# Patient Record
Sex: FEMALE | Race: WHITE | Employment: UNEMPLOYED | URBAN - METROPOLITAN AREA
[De-identification: names, ages, dates, MRNs, and addresses within clinical notes are randomized per-mention and may not be internally consistent; named-entity substitution may affect disease eponyms.]

---

## 2017-08-07 ENCOUNTER — TELEPHONE (OUTPATIENT)
Dept: FAMILY MEDICINE CLINIC | Age: 22
End: 2017-08-07

## 2017-08-25 ENCOUNTER — OFFICE VISIT (OUTPATIENT)
Dept: FAMILY MEDICINE CLINIC | Age: 22
End: 2017-08-25

## 2017-08-25 VITALS
WEIGHT: 135 LBS | TEMPERATURE: 98.5 F | DIASTOLIC BLOOD PRESSURE: 76 MMHG | SYSTOLIC BLOOD PRESSURE: 112 MMHG | OXYGEN SATURATION: 99 % | BODY MASS INDEX: 22.49 KG/M2 | HEIGHT: 65 IN | HEART RATE: 75 BPM

## 2017-08-25 DIAGNOSIS — Z00.00 ANNUAL PHYSICAL EXAM: Primary | ICD-10-CM

## 2017-08-25 LAB
BILIRUBIN, POC: NEGATIVE
BLOOD URINE, POC: NEGATIVE
CLARITY, POC: CLEAR
COLOR, POC: YELLOW
GLUCOSE URINE, POC: NEGATIVE
KETONES, POC: NEGATIVE
LEUKOCYTE EST, POC: NEGATIVE
NITRITE, POC: NEGATIVE
PH, POC: 7
PROTEIN, POC: NEGATIVE
SPECIFIC GRAVITY, POC: 1.01
UROBILINOGEN, POC: 0.2

## 2017-08-25 PROCEDURE — 81002 URINALYSIS NONAUTO W/O SCOPE: CPT | Performed by: FAMILY MEDICINE

## 2017-08-25 PROCEDURE — 99395 PREV VISIT EST AGE 18-39: CPT | Performed by: FAMILY MEDICINE

## 2017-08-25 ASSESSMENT — PATIENT HEALTH QUESTIONNAIRE - PHQ9
SUM OF ALL RESPONSES TO PHQ9 QUESTIONS 1 & 2: 0
2. FEELING DOWN, DEPRESSED OR HOPELESS: 0
SUM OF ALL RESPONSES TO PHQ QUESTIONS 1-9: 0
1. LITTLE INTEREST OR PLEASURE IN DOING THINGS: 0

## 2017-09-29 ENCOUNTER — OFFICE VISIT (OUTPATIENT)
Dept: FAMILY MEDICINE CLINIC | Age: 22
End: 2017-09-29

## 2017-09-29 VITALS
BODY MASS INDEX: 23.52 KG/M2 | OXYGEN SATURATION: 99 % | HEIGHT: 64 IN | TEMPERATURE: 98.9 F | SYSTOLIC BLOOD PRESSURE: 116 MMHG | HEART RATE: 88 BPM | DIASTOLIC BLOOD PRESSURE: 60 MMHG | RESPIRATION RATE: 17 BRPM | WEIGHT: 137.8 LBS

## 2017-09-29 DIAGNOSIS — J30.2 SEASONAL ALLERGIC RHINITIS, UNSPECIFIED ALLERGIC RHINITIS TRIGGER: ICD-10-CM

## 2017-09-29 DIAGNOSIS — Z23 NEED FOR INFLUENZA VACCINATION: ICD-10-CM

## 2017-09-29 DIAGNOSIS — R10.84 GENERALIZED ABDOMINAL CRAMPING: Primary | ICD-10-CM

## 2017-09-29 PROCEDURE — 90471 IMMUNIZATION ADMIN: CPT | Performed by: CLINICAL NURSE SPECIALIST

## 2017-09-29 PROCEDURE — 99213 OFFICE O/P EST LOW 20 MIN: CPT | Performed by: CLINICAL NURSE SPECIALIST

## 2017-09-29 PROCEDURE — 90686 IIV4 VACC NO PRSV 0.5 ML IM: CPT | Performed by: CLINICAL NURSE SPECIALIST

## 2017-09-29 ASSESSMENT — ENCOUNTER SYMPTOMS
HEMATOCHEZIA: 1
SHORTNESS OF BREATH: 0
CONSTIPATION: 0
NAUSEA: 0
ABDOMINAL PAIN: 1
VOMITING: 0
COUGH: 0
BACK PAIN: 0
BLOATING: 0
CHEST TIGHTNESS: 0
TENESMUS: 0

## 2018-01-05 ENCOUNTER — OFFICE VISIT (OUTPATIENT)
Dept: FAMILY MEDICINE CLINIC | Age: 23
End: 2018-01-05

## 2018-01-05 VITALS
HEIGHT: 64 IN | DIASTOLIC BLOOD PRESSURE: 70 MMHG | BODY MASS INDEX: 22.88 KG/M2 | TEMPERATURE: 98.6 F | HEART RATE: 110 BPM | SYSTOLIC BLOOD PRESSURE: 118 MMHG | WEIGHT: 134 LBS | OXYGEN SATURATION: 99 %

## 2018-01-05 DIAGNOSIS — R42 POSTURAL DIZZINESS WITH PRESYNCOPE: Primary | ICD-10-CM

## 2018-01-05 DIAGNOSIS — R55 POSTURAL DIZZINESS WITH PRESYNCOPE: Primary | ICD-10-CM

## 2018-01-05 DIAGNOSIS — Z00.00 ANNUAL PHYSICAL EXAM: ICD-10-CM

## 2018-01-05 DIAGNOSIS — R42 LIGHTHEADEDNESS: ICD-10-CM

## 2018-01-05 DIAGNOSIS — R42 DIZZINESS: ICD-10-CM

## 2018-01-05 LAB
BASOPHILS ABSOLUTE: 0 K/UL (ref 0–0.2)
BASOPHILS RELATIVE PERCENT: 0.3 %
BILIRUBIN, POC: NORMAL
BLOOD URINE, POC: NORMAL
CHOLESTEROL, TOTAL: 162 MG/DL (ref 0–199)
CLARITY, POC: CLEAR
COLOR, POC: YELLOW
CONTROL: NORMAL
EOSINOPHILS ABSOLUTE: 0 K/UL (ref 0–0.6)
EOSINOPHILS RELATIVE PERCENT: 0.7 %
GLUCOSE BLD-MCNC: 76 MG/DL
GLUCOSE URINE, POC: NORMAL
HCT VFR BLD CALC: 38.8 % (ref 36–48)
HDLC SERPL-MCNC: 70 MG/DL (ref 40–60)
HEMOGLOBIN: 13.2 G/DL (ref 12–16)
KETONES, POC: NORMAL
LDL CHOLESTEROL CALCULATED: 82 MG/DL
LEUKOCYTE EST, POC: NORMAL
LYMPHOCYTES ABSOLUTE: 1.5 K/UL (ref 1–5.1)
LYMPHOCYTES RELATIVE PERCENT: 24.9 %
MCH RBC QN AUTO: 32.7 PG (ref 26–34)
MCHC RBC AUTO-ENTMCNC: 34.1 G/DL (ref 31–36)
MCV RBC AUTO: 96 FL (ref 80–100)
MONOCYTES ABSOLUTE: 0.3 K/UL (ref 0–1.3)
MONOCYTES RELATIVE PERCENT: 4.3 %
NEUTROPHILS ABSOLUTE: 4.2 K/UL (ref 1.7–7.7)
NEUTROPHILS RELATIVE PERCENT: 69.8 %
NITRITE, POC: NORMAL
PDW BLD-RTO: 12.4 % (ref 12.4–15.4)
PH, POC: 6.5
PLATELET # BLD: 213 K/UL (ref 135–450)
PMV BLD AUTO: 8.3 FL (ref 5–10.5)
PREGNANCY TEST URINE, POC: NORMAL
PROTEIN, POC: NORMAL
RBC # BLD: 4.04 M/UL (ref 4–5.2)
SPECIFIC GRAVITY, POC: 1.02
TRIGL SERPL-MCNC: 52 MG/DL (ref 0–150)
TSH SERPL DL<=0.05 MIU/L-ACNC: 2.23 UIU/ML (ref 0.27–4.2)
UROBILINOGEN, POC: 0.2
VITAMIN D 25-HYDROXY: 36.1 NG/ML
VLDLC SERPL CALC-MCNC: 10 MG/DL
WBC # BLD: 6 K/UL (ref 4–11)

## 2018-01-05 PROCEDURE — 93000 ELECTROCARDIOGRAM COMPLETE: CPT | Performed by: FAMILY MEDICINE

## 2018-01-05 PROCEDURE — 81002 URINALYSIS NONAUTO W/O SCOPE: CPT | Performed by: FAMILY MEDICINE

## 2018-01-05 PROCEDURE — 82962 GLUCOSE BLOOD TEST: CPT | Performed by: FAMILY MEDICINE

## 2018-01-05 PROCEDURE — 81025 URINE PREGNANCY TEST: CPT | Performed by: FAMILY MEDICINE

## 2018-01-05 PROCEDURE — 99214 OFFICE O/P EST MOD 30 MIN: CPT | Performed by: FAMILY MEDICINE

## 2018-01-06 ASSESSMENT — ENCOUNTER SYMPTOMS
BLOOD IN STOOL: 0
SHORTNESS OF BREATH: 0
DIARRHEA: 0
COUGH: 0
ABDOMINAL PAIN: 0
CONSTIPATION: 0
NAUSEA: 0
CHEST TIGHTNESS: 0

## 2018-01-06 NOTE — PROGRESS NOTES
SUBJECTIVE:  Nicolette Amaro   1995   female   No Known Allergies    Chief Complaint   Patient presents with    Other     diziness. started two days ago at work, felt as if she would pass out, BP was high. Patient Active Problem List    Diagnosis Date Noted    Allergic rhinitis 07/22/2014       HPI   Pt is here today with an episode of lightheadedness and feeling as if she was going to pass out and heart racing. Reports she ha been at work as a  in a nursing home for several hrs when she started to feel light headed and felt like she could pass out. She then sat down and sx improved. Denies CP,SOB, ha, or any neurologic sx. Denies any OTC meds. Has been eating well. Some caffeine throughout the day. No recent URI sx, fever/chills. Past Medical History:   Diagnosis Date    Allergic rhinitis      Social History     Social History    Marital status: Single     Spouse name: N/A    Number of children: N/A    Years of education: N/A     Occupational History    Not on file. Social History Main Topics    Smoking status: Never Smoker    Smokeless tobacco: Never Used    Alcohol use No    Drug use: No    Sexual activity: Yes     Partners: Male     Birth control/ protection: Pill     Other Topics Concern    Not on file     Social History Narrative    No narrative on file     No family history on file. Review of Systems   Constitutional: Negative for activity change, appetite change, fatigue and unexpected weight change. Respiratory: Negative for cough, chest tightness and shortness of breath. Cardiovascular: Negative for chest pain, palpitations and leg swelling. Gastrointestinal: Negative for abdominal pain, blood in stool, constipation, diarrhea and nausea. Endocrine: Negative for cold intolerance and heat intolerance. Musculoskeletal: Negative for arthralgias and myalgias. Skin: Negative for rash. Neurological: Negative.   Negative for weakness, light-headedness and no Micro    POCT Glucose    POCT urine pregnancy    EKG 12 Lead     Order Specific Question:   Reason for Exam?     Answer: Other     No current outpatient prescriptions on file. No current facility-administered medications for this visit. No Follow-up on file.     Tesha Edwards MD

## 2018-02-12 ENCOUNTER — OFFICE VISIT (OUTPATIENT)
Dept: FAMILY MEDICINE CLINIC | Age: 23
End: 2018-02-12

## 2018-02-12 VITALS
HEIGHT: 64 IN | DIASTOLIC BLOOD PRESSURE: 70 MMHG | WEIGHT: 137.4 LBS | OXYGEN SATURATION: 99 % | RESPIRATION RATE: 17 BRPM | SYSTOLIC BLOOD PRESSURE: 120 MMHG | BODY MASS INDEX: 23.46 KG/M2 | HEART RATE: 124 BPM | TEMPERATURE: 101.2 F

## 2018-02-12 DIAGNOSIS — R50.9 FEVER, UNSPECIFIED FEVER CAUSE: ICD-10-CM

## 2018-02-12 DIAGNOSIS — R52 BODY ACHES: ICD-10-CM

## 2018-02-12 DIAGNOSIS — J11.1 INFLUENZA-LIKE ILLNESS: Primary | ICD-10-CM

## 2018-02-12 LAB
INFLUENZA A ANTIBODY: NORMAL
INFLUENZA B ANTIBODY: NORMAL
S PYO AG THROAT QL: NORMAL

## 2018-02-12 PROCEDURE — 87804 INFLUENZA ASSAY W/OPTIC: CPT | Performed by: CLINICAL NURSE SPECIALIST

## 2018-02-12 PROCEDURE — 99214 OFFICE O/P EST MOD 30 MIN: CPT | Performed by: CLINICAL NURSE SPECIALIST

## 2018-02-12 PROCEDURE — 87880 STREP A ASSAY W/OPTIC: CPT | Performed by: CLINICAL NURSE SPECIALIST

## 2018-02-12 RX ORDER — OSELTAMIVIR PHOSPHATE 75 MG/1
75 CAPSULE ORAL 2 TIMES DAILY
Qty: 10 CAPSULE | Refills: 0 | Status: SHIPPED | OUTPATIENT
Start: 2018-02-12 | End: 2018-02-17

## 2018-02-12 ASSESSMENT — ENCOUNTER SYMPTOMS
COUGH: 1
CONSTIPATION: 0
SORE THROAT: 1
SINUS PRESSURE: 0
CHEST TIGHTNESS: 0
RHINORRHEA: 1
SHORTNESS OF BREATH: 0
ABDOMINAL PAIN: 1
CHANGE IN BOWEL HABIT: 0
VOMITING: 0
NAUSEA: 0
FLU SYMPTOMS: 1
SINUS PAIN: 0

## 2018-12-20 NOTE — PATIENT INSTRUCTIONS
Tamiflu 1 capsule twice a day    Delsym twice a day as needed for cough. Cepacol Lozenges as needed for sore throat. Tylenol and or Motrin as needed for pain and fever. Encourage rest and fluids. Follow up if symptoms worsen of persist      Patient Education        Influenza (Flu): Care Instructions  Your Care Instructions    Influenza (flu) is an infection in the lungs and breathing passages. It is caused by the influenza virus. There are different strains, or types, of the flu virus from year to year. Unlike the common cold, the flu comes on suddenly and the symptoms, such as a cough, congestion, fever, chills, fatigue, aches, and pains, are more severe. These symptoms may last up to 10 days. Although the flu can make you feel very sick, it usually doesn't cause serious health problems. Home treatment is usually all you need for flu symptoms. But your doctor may prescribe antiviral medicine to prevent other health problems, such as pneumonia, from developing. Older people and those who have a long-term health condition, such as lung disease, are most at risk for having pneumonia or other health problems. Follow-up care is a key part of your treatment and safety. Be sure to make and go to all appointments, and call your doctor if you are having problems. It's also a good idea to know your test results and keep a list of the medicines you take. How can you care for yourself at home? · Get plenty of rest.  · Drink plenty of fluids, enough so that your urine is light yellow or clear like water. If you have kidney, heart, or liver disease and have to limit fluids, talk with your doctor before you increase the amount of fluids you drink. · Take an over-the-counter pain medicine if needed, such as acetaminophen (Tylenol), ibuprofen (Advil, Motrin), or naproxen (Aleve), to relieve fever, headache, and muscle aches. Read and follow all instructions on the label.  No one younger than 20 should take the day. It may go up during hot weather or when you exercise, wear heavy clothes, or take a hot bath. Your temperature may also be different depending on how you take it. A temperature taken in the mouth (oral) or under the arm may be a little lower than your core temperature (rectal). What is a fever temperature? A core temperature of 100.4°F or above is considered a fever. What can cause a fever? A fever may be caused by:  · Infections. This is the most common cause of a fever. Examples of infections that can cause a fever include the flu, a kidney infection, or pneumonia. · Some medicines. · Severe trauma or injury, such as a heart attack, stroke, heatstroke, or burns. · Other medical conditions, such as arthritis and some cancers. How can you treat a fever at home? · Ask your doctor if you can take an over-the-counter pain medicine, such as acetaminophen (Tylenol), ibuprofen (Advil, Motrin), or naproxen (Aleve). Be safe with medicines. Read and follow all instructions on the label. · To prevent dehydration, drink plenty of fluids. Choose water and other caffeine-free clear liquids until you feel better. If you have kidney, heart, or liver disease and have to limit fluids, talk with your doctor before you increase the amount of fluids you drink. Follow-up care is a key part of your treatment and safety. Be sure to make and go to all appointments, and call your doctor if you are having problems. It's also a good idea to know your test results and keep a list of the medicines you take. Where can you learn more? Go to [email or web address] org and sign in to your [employer] account. Enter E743 in the [employer] box to learn more about \"Learning About Fever. \"     If you do not have an account, please click on the \"Sign Up Now\" link. Current as of: March 20, 2017  Content Version: 11.5  © 6635-1679 Healthwise, Incorporated.  Care instructions adapted under license by [facility] Abdomen soft, nontender, nondistended, bowel sounds present in all 4 quadrants.

## 2019-05-22 ENCOUNTER — NURSE TRIAGE (OUTPATIENT)
Dept: OTHER | Facility: CLINIC | Age: 24
End: 2019-05-22

## 2019-05-22 NOTE — TELEPHONE ENCOUNTER
Reason for Disposition   ACUTE NEUROLOGIC SYMPTOM and now fine    Protocols used: HEAD INJURY-ADULT-[place]    Pt reports dizziness after a fall in which she hit her head. States this fall happened Monday night while intoxicated. Pt reports blurry vision and states she vomited once on Monday night. Soft transfer to Dr. Dannie Willoughby, symptoms explained and she states she would feel comfortable with pt going to ED. Explained to pt care advice in which she plans to follow. Will be going to [address] [place] [address] ED for evaluation. Care advice as documented.

## 2019-06-21 ENCOUNTER — OFFICE VISIT (OUTPATIENT)
Dept: FAMILY MEDICINE CLINIC | Age: 24
End: 2019-06-21
Payer: COMMERCIAL

## 2019-06-21 VITALS
DIASTOLIC BLOOD PRESSURE: 70 MMHG | HEART RATE: 80 BPM | BODY MASS INDEX: 24.24 KG/M2 | TEMPERATURE: 98.7 F | OXYGEN SATURATION: 99 % | WEIGHT: 142 LBS | SYSTOLIC BLOOD PRESSURE: 118 MMHG | HEIGHT: 64 IN | RESPIRATION RATE: 17 BRPM

## 2019-06-21 DIAGNOSIS — N30.00 ACUTE CYSTITIS WITHOUT HEMATURIA: Primary | ICD-10-CM

## 2019-06-21 DIAGNOSIS — R39.9 UTI SYMPTOMS: ICD-10-CM

## 2019-06-21 LAB
BILIRUBIN, POC: NORMAL
BLOOD URINE, POC: NORMAL
CLARITY, POC: NORMAL
COLOR, POC: YELLOW
CONTROL: NORMAL
GLUCOSE URINE, POC: NORMAL
KETONES, POC: NORMAL
LEUKOCYTE EST, POC: NORMAL
NITRITE, POC: NORMAL
PH, POC: 7
PREGNANCY TEST URINE, POC: NORMAL
PROTEIN, POC: NORMAL
SPECIFIC GRAVITY, POC: 1.01
UROBILINOGEN, POC: NORMAL

## 2019-06-21 PROCEDURE — 99213 OFFICE O/P EST LOW 20 MIN: CPT | Performed by: CLINICAL NURSE SPECIALIST

## 2019-06-21 PROCEDURE — 81002 URINALYSIS NONAUTO W/O SCOPE: CPT | Performed by: CLINICAL NURSE SPECIALIST

## 2019-06-21 PROCEDURE — 81025 URINE PREGNANCY TEST: CPT | Performed by: CLINICAL NURSE SPECIALIST

## 2019-06-21 RX ORDER — NITROFURANTOIN 25; 75 MG/1; MG/1
100 CAPSULE ORAL 2 TIMES DAILY
Qty: 10 CAPSULE | Refills: 0 | Status: SHIPPED | OUTPATIENT
Start: 2019-06-21 | End: 2019-06-26

## 2019-06-21 ASSESSMENT — ENCOUNTER SYMPTOMS
STRIDOR: 0
WHEEZING: 0
VOMITING: 0
DIARRHEA: 0
NAUSEA: 0
CHEST TIGHTNESS: 0
ABDOMINAL PAIN: 0
COUGH: 0

## 2019-06-21 NOTE — PATIENT INSTRUCTIONS
Cultures pending    Antibiotics as directed twice a day 5 days    Encourage plenty of water, avoid caffeine    Azo as needed for bladder spasms, may discolor her urine    Tylenol as needed/directed for discomfort    Call the office if develop back pain, fever, nausea or vomiting     Patient Education        Urinary Tract Infection in Women: Care Instructions  Your Care Instructions    A urinary tract infection, or UTI, is a general term for an infection anywhere between the kidneys and the urethra (where urine comes out). Most UTIs are bladder infections. They often cause pain or burning when you urinate. UTIs are caused by bacteria and can be cured with antibiotics. Be sure to complete your treatment so that the infection goes away. Follow-up care is a key part of your treatment and safety. Be sure to make and go to all appointments, and call your doctor if you are having problems. It's also a good idea to know your test results and keep a list of the medicines you take. How can you care for yourself at home? · Take your antibiotics as directed. Do not stop taking them just because you feel better. You need to take the full course of antibiotics. · Drink extra water and other fluids for the next day or two. This may help wash out the bacteria that are causing the infection. (If you have kidney, heart, or liver disease and have to limit fluids, talk with your doctor before you increase your fluid intake.)  · Avoid drinks that are carbonated or have caffeine. They can irritate the bladder. · Urinate often. Try to empty your bladder each time. · To relieve pain, take a hot bath or lay a heating pad set on low over your lower belly or genital area. Never go to sleep with a heating pad in place. To prevent UTIs  · Drink plenty of water each day. This helps you urinate often, which clears bacteria from your system.  (If you have kidney, heart, or liver disease and have to limit fluids, talk with your doctor before you increase your fluid intake.)  · Urinate when you need to. · Urinate right after you have sex. · Change sanitary pads often. · Avoid douches, bubble baths, feminine hygiene sprays, and other feminine hygiene products that have deodorants. · After going to the bathroom, wipe from front to back. When should you call for help? Call your doctor now or seek immediate medical care if:    · Symptoms such as fever, chills, nausea, or vomiting get worse or appear for the first time.     · You have new pain in your back just below your rib cage. This is called flank pain.     · There is new blood or pus in your urine.     · You have any problems with your antibiotic medicine.    Watch closely for changes in your health, and be sure to contact your doctor if:    · You are not getting better after taking an antibiotic for 2 days.     · Your symptoms go away but then come back. Where can you learn more? Go to https://L[employer]io.[email or web address]. org and sign in to your [employer] account. Enter K813 in the [employer] box to learn more about \"Urinary Tract Infection in Women: Care Instructions. \"     If you do not have an account, please click on the \"Sign Up Now\" link. Current as of: December 19, 2018  Content Version: 12.0  © 7848-4560 Healthwise, Incorporated. Care instructions adapted under license by [facility] ([facility]). If you have questions about a medical condition or this instruction, always ask your healthcare professional. [place] [address] any warranty or liability for your use of this information.

## 2019-06-21 NOTE — PROGRESS NOTES
SUBJECTIVE:    Patient ID:  Meron Hauser is a 26 y.o. female      Patient is here for an acute visit for complaints of UTI symptoms for 2 days. Urinary Tract Infection    This is a new problem. Episode onset: 2 days. The problem occurs every urination. The problem has been gradually worsening. The quality of the pain is described as burning. The pain is at a severity of 5/10. There has been no fever. She is sexually active. There is no history of pyelonephritis. Associated symptoms include frequency, hesitancy, a possible pregnancy and urgency. Pertinent negatives include no chills, discharge, flank pain, hematuria, nausea, sweats or vomiting. She has tried nothing for the symptoms. There is no history of kidney stones, recurrent UTIs, a single kidney or a urological procedure. No current outpatient medications on file prior to visit. No current facility-administered medications on file prior to visit. Past Medical History:   Diagnosis Date    Allergic rhinitis      No past surgical history on file. No family history on file.   Social History     Socioeconomic History    Marital status: Single     Spouse name: Not on file    Number of children: Not on file    Years of education: Not on file    Highest education level: Not on file   Occupational History    Not on file   Social Needs    Financial resource strain: Not on file    Food insecurity:     Worry: Not on file     Inability: Not on file    Transportation needs:     Medical: Not on file     Non-medical: Not on file   Tobacco Use    Smoking status: Never Smoker    Smokeless tobacco: Never Used   Substance and Sexual Activity    Alcohol use: No     Alcohol/week: 0.0 oz    Drug use: No    Sexual activity: Yes     Partners: Male     Birth control/protection: Pill   Lifestyle    Physical activity:     Days per week: Not on file     Minutes per session: Not on file    Stress: Not on file   Relationships    Social connections: Talks on phone: Not on file     Gets together: Not on file     Attends Sabianism service: Not on file     Active member of club or organization: Not on file     Attends meetings of clubs or organizations: Not on file     Relationship status: Not on file    Intimate partner violence:     Fear of current or ex partner: Not on file     Emotionally abused: Not on file     Physically abused: Not on file     Forced sexual activity: Not on file   Other Topics Concern    Not on file   Social History Narrative    Not on file       Review of Systems   Constitutional: Negative for chills and fever. Eyes: Negative for visual disturbance. Respiratory: Negative for cough, chest tightness, wheezing and stridor. Cardiovascular: Negative for chest pain and palpitations. Gastrointestinal: Negative for abdominal pain, diarrhea, nausea and vomiting. Genitourinary: Positive for dysuria, frequency, hesitancy and urgency. Negative for flank pain and hematuria. Musculoskeletal: Negative for arthralgias. Skin: Negative for rash. OBJECTIVE:    Physical Exam   Constitutional: She is oriented to person, place, and time. She appears well-developed and well-nourished. No distress. HENT:   Head: Normocephalic and atraumatic. Right Ear: External ear normal.   Left Ear: External ear normal.   Nose: Nose normal.   Eyes: Pupils are equal, round, and reactive to light. Conjunctivae are normal.   Neck: Normal range of motion. Neck supple. No tracheal deviation present. Cardiovascular: Normal rate, regular rhythm and normal heart sounds. Pulmonary/Chest: Effort normal and breath sounds normal. No respiratory distress. She has no wheezes. She has no rales. She exhibits no tenderness. Abdominal: Soft. Bowel sounds are normal. She exhibits no distension. There is tenderness (mild) in the suprapubic area. There is no guarding and no CVA tenderness. Musculoskeletal: Normal range of motion. She exhibits no edema. Neurological: She is alert and oriented to person, place, and time. Skin: Skin is warm and dry. No rash noted. Psychiatric: She has a normal mood and affect. Her behavior is normal.   Nursing note and vitals reviewed. /70 (Site: Left Upper Arm, Position: Sitting, Cuff Size: Medium Adult)   Pulse 80   Temp 98.7 °F (37.1 °C)   Resp 17   Ht 5' 4\" (1.626 m)   Wt 142 lb (64.4 kg)   LMP 05/21/2019   SpO2 99%   BMI 24.37 kg/m²    BP Readings from Last 3 Encounters:   06/21/19 118/70   02/12/18 120/70   01/05/18 118/70      Wt Readings from Last 3 Encounters:   06/21/19 142 lb (64.4 kg)   02/12/18 137 lb 6.4 oz (62.3 kg)   01/05/18 134 lb (60.8 kg)       ASSESSMENT & PLAN:    1. Acute cystitis without hematuria  - nitrofurantoin, macrocrystal-monohydrate, (MACROBID) 100 MG capsule; Take 1 capsule by mouth 2 times daily for 5 days  Dispense: 10 capsule; Refill: 0  - Cultures pending  - Antibiotics as directed twice a day 5 days  - Encourage plenty of water, avoid caffeine  - Azo as needed for bladder spasms, may discolor her urine  - Tylenol as needed/directed for discomfort  - Call the office if develop back pain, fever, nausea or vomiting     2. UTI symptoms  - POCT Urinalysis no Micro (trace of leukocytes)  - URINE CULTURE (pending)  - POCT urine pregnancy (negative)      Continue current treatment plan. Current Outpatient Medications   Medication Sig Dispense Refill    nitrofurantoin, macrocrystal-monohydrate, (MACROBID) 100 MG capsule Take 1 capsule by mouth 2 times daily for 5 days 10 capsule 0     No current facility-administered medications for this visit. Return if symptoms worsen or fail to improve, for cystitis, UTI symptoms. Call office if experience side effects from medications. Please note that some or all of this record was generated using voice recognition software.  If there are any questions about the content of this document, please contact the author as some errors in transcription may have occurred.

## 2019-06-24 LAB
ORGANISM: ABNORMAL
URINE CULTURE, ROUTINE: ABNORMAL
URINE CULTURE, ROUTINE: ABNORMAL

## 2019-09-17 ENCOUNTER — TELEPHONE (OUTPATIENT)
Dept: FAMILY MEDICINE CLINIC | Age: 24
End: 2019-09-17

## 2019-09-20 ENCOUNTER — OFFICE VISIT (OUTPATIENT)
Dept: FAMILY MEDICINE CLINIC | Age: 24
End: 2019-09-20
Payer: COMMERCIAL

## 2019-09-20 VITALS
WEIGHT: 142 LBS | OXYGEN SATURATION: 97 % | SYSTOLIC BLOOD PRESSURE: 110 MMHG | BODY MASS INDEX: 24.37 KG/M2 | DIASTOLIC BLOOD PRESSURE: 70 MMHG | HEART RATE: 104 BPM | TEMPERATURE: 98.3 F

## 2019-09-20 DIAGNOSIS — R30.9 PAINFUL URINATION: Primary | ICD-10-CM

## 2019-09-20 DIAGNOSIS — N30.00 ACUTE CYSTITIS WITHOUT HEMATURIA: ICD-10-CM

## 2019-09-20 LAB
BACTERIA: ABNORMAL /HPF
BILIRUBIN URINE: ABNORMAL
BILIRUBIN, POC: ABNORMAL
BLOOD URINE, POC: ABNORMAL
BLOOD, URINE: NEGATIVE
CLARITY, POC: CLEAR
CLARITY: CLEAR
COLOR, POC: ABNORMAL
COLOR: YELLOW
EPITHELIAL CELLS, UA: 5 /HPF (ref 0–5)
GLUCOSE URINE, POC: ABNORMAL
GLUCOSE URINE: NEGATIVE MG/DL
HYALINE CASTS: 5 /LPF (ref 0–8)
KETONES, POC: ABNORMAL
KETONES, URINE: NEGATIVE MG/DL
LEUKOCYTE EST, POC: ABNORMAL
LEUKOCYTE ESTERASE, URINE: ABNORMAL
MICROSCOPIC EXAMINATION: YES
NITRITE, POC: ABNORMAL
NITRITE, URINE: NEGATIVE
PH UA: 6.5 (ref 5–8)
PH, POC: 6.5
PROTEIN UA: ABNORMAL MG/DL
PROTEIN, POC: 100
RBC UA: 7 /HPF (ref 0–4)
SPECIFIC GRAVITY UA: 1.03 (ref 1–1.03)
SPECIFIC GRAVITY, POC: 1.02
URINE TYPE: ABNORMAL
UROBILINOGEN, POC: 1
UROBILINOGEN, URINE: 1 E.U./DL
WBC UA: 13 /HPF (ref 0–5)

## 2019-09-20 PROCEDURE — 81002 URINALYSIS NONAUTO W/O SCOPE: CPT | Performed by: FAMILY MEDICINE

## 2019-09-20 PROCEDURE — 99213 OFFICE O/P EST LOW 20 MIN: CPT | Performed by: FAMILY MEDICINE

## 2019-09-20 ASSESSMENT — PATIENT HEALTH QUESTIONNAIRE - PHQ9
2. FEELING DOWN, DEPRESSED OR HOPELESS: 0
SUM OF ALL RESPONSES TO PHQ QUESTIONS 1-9: 0
SUM OF ALL RESPONSES TO PHQ QUESTIONS 1-9: 0
SUM OF ALL RESPONSES TO PHQ9 QUESTIONS 1 & 2: 0
1. LITTLE INTEREST OR PLEASURE IN DOING THINGS: 0

## 2019-09-21 LAB — URINE CULTURE, ROUTINE: NORMAL

## 2019-09-23 LAB
C. TRACHOMATIS DNA ,URINE: POSITIVE
N. GONORRHOEAE DNA, URINE: NEGATIVE

## 2019-09-25 ENCOUNTER — OFFICE VISIT (OUTPATIENT)
Dept: FAMILY MEDICINE CLINIC | Age: 24
End: 2019-09-25
Payer: COMMERCIAL

## 2019-09-25 VITALS
TEMPERATURE: 98.7 F | HEART RATE: 98 BPM | OXYGEN SATURATION: 99 % | BODY MASS INDEX: 24.72 KG/M2 | SYSTOLIC BLOOD PRESSURE: 120 MMHG | WEIGHT: 144 LBS | RESPIRATION RATE: 16 BRPM | DIASTOLIC BLOOD PRESSURE: 84 MMHG

## 2019-09-25 DIAGNOSIS — A74.9 CHLAMYDIA INFECTION: Primary | ICD-10-CM

## 2019-09-25 DIAGNOSIS — R30.0 DYSURIA: ICD-10-CM

## 2019-09-25 PROCEDURE — 99213 OFFICE O/P EST LOW 20 MIN: CPT | Performed by: FAMILY MEDICINE

## 2019-09-25 RX ORDER — DOXYCYCLINE HYCLATE 100 MG
100 TABLET ORAL 2 TIMES DAILY
Qty: 14 TABLET | Refills: 0 | Status: SHIPPED | OUTPATIENT
Start: 2019-09-25 | End: 2019-10-02

## 2019-09-26 RX ORDER — CEFTRIAXONE 500 MG/1
500 INJECTION, POWDER, FOR SOLUTION INTRAMUSCULAR; INTRAVENOUS ONCE
Qty: 500 MG | Refills: 0
Start: 2019-09-26 | End: 2019-09-26

## 2019-09-26 ASSESSMENT — ENCOUNTER SYMPTOMS
COUGH: 0
SHORTNESS OF BREATH: 0
ABDOMINAL PAIN: 0

## 2019-09-26 NOTE — PROGRESS NOTES
SUBJECTIVE:  Liss Rodney   1995   female   No Known Allergies    Chief Complaint   Patient presents with    Discuss Labs        Patient Active Problem List    Diagnosis Date Noted    Allergic rhinitis 07/22/2014       HPI   Pt is here today for tx of Chlamydia vaginitis. She was evaluated a few d ago for persisting dysuria after being treated for UTI per GYN. Urine cx were negative but positive for C. Trachomatis. Pt reports sx have been intermittent and can be mild burning with urination or a vaginal DC. No abdominal pain, N,V or fever. She did engage in unprotected sex prior to sx.    Past Medical History:   Diagnosis Date    Allergic rhinitis      Social History     Socioeconomic History    Marital status: Single     Spouse name: Not on file    Number of children: Not on file    Years of education: Not on file    Highest education level: Not on file   Occupational History    Not on file   Social Needs    Financial resource strain: Not on file    Food insecurity:     Worry: Not on file     Inability: Not on file    Transportation needs:     Medical: Not on file     Non-medical: Not on file   Tobacco Use    Smoking status: Never Smoker    Smokeless tobacco: Never Used   Substance and Sexual Activity    Alcohol use: No     Alcohol/week: 0.0 standard drinks    Drug use: No    Sexual activity: Yes     Partners: Male     Birth control/protection: Pill   Lifestyle    Physical activity:     Days per week: Not on file     Minutes per session: Not on file    Stress: Not on file   Relationships    Social connections:     Talks on phone: Not on file     Gets together: Not on file     Attends Worship service: Not on file     Active member of club or organization: Not on file     Attends meetings of clubs or organizations: Not on file     Relationship status: Not on file    Intimate partner violence:     Fear of current or ex partner: Not on file     Emotionally abused: Not on file

## 2019-09-30 ASSESSMENT — ENCOUNTER SYMPTOMS
SHORTNESS OF BREATH: 0
COUGH: 0
ABDOMINAL PAIN: 0

## 2019-09-30 NOTE — PROGRESS NOTES
SUBJECTIVE:  Paulo Hill   1995   female   No Known Allergies    Chief Complaint   Patient presents with    Urinary Tract Infection     in on 6/22/19 treated for UTI finished antibiotics still felt the UTI was present went to GYN in august showed UTI  given same antibiotic. Feels the UTI still has not fulled cleared         Patient Active Problem List    Diagnosis Date Noted    Allergic rhinitis 07/22/2014       HPI   Pt is here today co intermittent pain/burning with urination. She has been treated here and and GYN office for UTI. reports sx improved for a few days and returned. She thinks she was tested for other things at GYN office but not sure? Denies abdominal pain, N,V. No fever/chills. Maybe a mild yellow vaginal DC .she has had unprotected sex in the last couple of weeks. No hx of STI.    Past Medical History:   Diagnosis Date    Allergic rhinitis      Social History     Socioeconomic History    Marital status: Single     Spouse name: Not on file    Number of children: Not on file    Years of education: Not on file    Highest education level: Not on file   Occupational History    Not on file   Social Needs    Financial resource strain: Not on file    Food insecurity:     Worry: Not on file     Inability: Not on file    Transportation needs:     Medical: Not on file     Non-medical: Not on file   Tobacco Use    Smoking status: Never Smoker    Smokeless tobacco: Never Used   Substance and Sexual Activity    Alcohol use: No     Alcohol/week: 0.0 standard drinks    Drug use: No    Sexual activity: Yes     Partners: Male     Birth control/protection: Pill   Lifestyle    Physical activity:     Days per week: Not on file     Minutes per session: Not on file    Stress: Not on file   Relationships    Social connections:     Talks on phone: Not on file     Gets together: Not on file     Attends Christian service: Not on file     Active member of club or organization: Not on file has been treated X2  Will send urine for cx      Orders Placed This Encounter   Procedures    Urine Culture     Order Specific Question:   Specify (ex-cath, midstream, cysto, etc)? Answer:   midstream    Urine Culture     Order Specific Question:   Specify (ex-cath, midstream, cysto, etc)? Answer:   midstream    C.trachomatis N.gonorrhoeae DNA, Urine    Urinalysis    Urinalysis    Microscopic Urinalysis    POCT Urinalysis no Micro     Current Outpatient Medications   Medication Sig Dispense Refill    doxycycline hyclate (VIBRA-TABS) 100 MG tablet Take 1 tablet by mouth 2 times daily for 7 days 14 tablet 0     No current facility-administered medications for this visit. Return if symptoms worsen or fail to improve.     Elvie Pineda MD

## 2019-10-04 ENCOUNTER — OFFICE VISIT (OUTPATIENT)
Dept: FAMILY MEDICINE CLINIC | Age: 24
End: 2019-10-04
Payer: COMMERCIAL

## 2019-10-04 VITALS
RESPIRATION RATE: 16 BRPM | DIASTOLIC BLOOD PRESSURE: 78 MMHG | SYSTOLIC BLOOD PRESSURE: 122 MMHG | HEART RATE: 73 BPM | BODY MASS INDEX: 24.82 KG/M2 | OXYGEN SATURATION: 98 % | WEIGHT: 144.6 LBS | TEMPERATURE: 98.1 F

## 2019-10-04 DIAGNOSIS — A74.9 CHLAMYDIA INFECTION: Primary | ICD-10-CM

## 2019-10-04 DIAGNOSIS — A64 STI (SEXUALLY TRANSMITTED INFECTION): ICD-10-CM

## 2019-10-04 PROCEDURE — 99213 OFFICE O/P EST LOW 20 MIN: CPT | Performed by: FAMILY MEDICINE

## 2019-10-05 ASSESSMENT — ENCOUNTER SYMPTOMS
ABDOMINAL PAIN: 0
CHEST TIGHTNESS: 0
COUGH: 0

## 2019-10-07 LAB
C. TRACHOMATIS DNA ,URINE: NEGATIVE
N. GONORRHOEAE DNA, URINE: NEGATIVE

## 2020-12-28 ENCOUNTER — VIRTUAL VISIT (OUTPATIENT)
Dept: FAMILY MEDICINE CLINIC | Age: 25
End: 2020-12-28
Payer: COMMERCIAL

## 2020-12-28 ENCOUNTER — NURSE TRIAGE (OUTPATIENT)
Dept: OTHER | Facility: CLINIC | Age: 25
End: 2020-12-28

## 2020-12-28 ENCOUNTER — OFFICE VISIT (OUTPATIENT)
Dept: PRIMARY CARE CLINIC | Age: 25
End: 2020-12-28
Payer: COMMERCIAL

## 2020-12-28 LAB
INFLUENZA A ANTIBODY: NORMAL
INFLUENZA B ANTIBODY: NORMAL
S PYO AG THROAT QL: NORMAL
SARS-COV-2, NAA: NOT DETECTED

## 2020-12-28 PROCEDURE — 87880 STREP A ASSAY W/OPTIC: CPT | Performed by: NURSE PRACTITIONER

## 2020-12-28 PROCEDURE — 99211 OFF/OP EST MAY X REQ PHY/QHP: CPT | Performed by: NURSE PRACTITIONER

## 2020-12-28 PROCEDURE — 99214 OFFICE O/P EST MOD 30 MIN: CPT | Performed by: CLINICAL NURSE SPECIALIST

## 2020-12-28 PROCEDURE — 87804 INFLUENZA ASSAY W/OPTIC: CPT | Performed by: NURSE PRACTITIONER

## 2020-12-28 PROCEDURE — 87804 INFLUENZA ASSAY W/OPTIC: CPT | Performed by: CLINICAL NURSE SPECIALIST

## 2020-12-28 ASSESSMENT — ENCOUNTER SYMPTOMS
RHINORRHEA: 0
DIARRHEA: 0
SORE THROAT: 1
COUGH: 0
CONSTIPATION: 0
VOMITING: 0
CHEST TIGHTNESS: 0
WHEEZING: 0
SHORTNESS OF BREATH: 0
NAUSEA: 0
ABDOMINAL PAIN: 0

## 2020-12-28 ASSESSMENT — PATIENT HEALTH QUESTIONNAIRE - PHQ9
SUM OF ALL RESPONSES TO PHQ QUESTIONS 1-9: 0
SUM OF ALL RESPONSES TO PHQ QUESTIONS 1-9: 0
1. LITTLE INTEREST OR PLEASURE IN DOING THINGS: 0
2. FEELING DOWN, DEPRESSED OR HOPELESS: 0
SUM OF ALL RESPONSES TO PHQ9 QUESTIONS 1 & 2: 0
SUM OF ALL RESPONSES TO PHQ QUESTIONS 1-9: 0

## 2020-12-28 NOTE — PROGRESS NOTES
Josias Thorne received a viral test for COVID-19, Influenza and Strep. They were educated on isolation and quarantine as appropriate. For any symptoms, they were directed to seek care from their PCP, given contact information to establish with a doctor, directed to an urgent care or the emergency room.

## 2020-12-28 NOTE — PATIENT INSTRUCTIONS
Schedule for testing today (rapid strep, flu and COVID-19)    Discussed and educated about COVID-19    Antibiotic as directed (depending on results)     Delsym twice a day as needed for cough. Cepacol Lozenges as needed for sore throat. Tylenol and or Motrin as needed/directed for pain and fever. Encourage rest and fluids. Follow-up if symptoms worsen or persist      Patient Education        Learning About Coronavirus [phone])  Coronavirus [phone]): Overview  What is coronavirus (COVID-19)? The coronavirus disease (COVID-19) is caused by a virus. It is an illness that was first found in December 2019. It has since spread worldwide. The virus can cause fever, cough, and trouble breathing. In severe cases, it can cause pneumonia and make it hard to breathe without help. It can cause death. This virus spreads person-to-person through droplets from coughing and sneezing. It can also spread when you are close to someone who is infected. And it can spread when you touch something that has the virus on it, such as a doorknob or a tabletop. Coronaviruses are a large group of viruses. They cause the common cold. They also cause more serious illnesses like [place] respiratory syndrome (MERS) and severe acute respiratory syndrome (SARS). COVID-19 is caused by a novel coronavirus. That means it's a new type that has not been seen in people before. How is COVID-19 treated? Mild illness can be treated at home, but more serious illness needs to be treated in the hospital. Treatment may include medicines to reduce symptoms, plus breathing support such as oxygen therapy or a ventilator. Other treatments, such as antiviral medicines, may help people who have COVID-19. What can you do to protect yourself from COVID-19? The best way to protect yourself from getting sick is to:  · Avoid areas where there is an outbreak. · Avoid contact with people who may be infected. · Avoid crowds and try to stay at least 6 feet away from other people. · Wash your hands often, especially after you cough or sneeze. Use soap and water, and scrub for at least 20 seconds. If soap and water aren't available, use an alcohol-based hand . · Avoid touching your mouth, nose, and eyes. What can you do to avoid spreading the virus to others? To help avoid spreading the virus to others:  · Wash your hands often with soap or alcohol-based hand sanitizers. · Cover your mouth with a tissue when you cough or sneeze. Then throw the tissue in the trash. · Use a disinfectant to clean things that you touch often. These include doorknobs, remote controls, phones, and handles on your refrigerator and microwave. And don't forget countertops, tabletops, bathrooms, and computer keyboards. · Wear a cloth face cover if you have to go to public areas. If you know or suspect that you have COVID-19:  · Stay home. Don't go to school, work, or public areas. And don't use public transportation, ride-shares, or taxis unless you have no choice. · Leave your home only if you need to get medical care or testing. But call the doctor's office first so they know you're coming. And wear a face cover. · Limit contact with people in your home. If possible, stay in a separate bedroom and use a separate bathroom. · Wear a face cover whenever you're around other people. It can help stop the spread of the virus when you cough or sneeze. · Clean and disinfect your home every day. Use household  and disinfectant wipes or sprays. Take special care to clean things that you grab with your hands. · Self-isolate until it's safe to be around others again. ? If you have symptoms, it's safe when you haven't had a fever for 3 days and your symptoms have improved and it's been at least 10 days since your symptoms started. ? If you were exposed to the virus but don't have symptoms, it's safe to be around others 14 days after exposure. ? Talk to your doctor about whether you also need testing, especially if you have a weakened immune system. When to call for help  Call 911 anytime you think you may need emergency care. For example, call if:  · You have severe trouble breathing. (You can't talk at all.)  · You have constant chest pain or pressure. · You are severely dizzy or lightheaded. · You are confused or can't think clearly. · Your face and lips have a blue color. · You passed out (lost consciousness) or are very hard to wake up. Call your doctor now if you develop symptoms such as:  · Shortness of breath. · Fever. · Cough. If you need to get care, call ahead to the doctor's office for instructions before you go. Make sure you wear a face cover to prevent exposing other people to the virus. Where can you get the latest information? The following health organizations are tracking and studying this virus. Their websites contain the most up-to-date information. Titus Lizama also learn what to do if you think you may have been exposed to the virus. · U.S. Centers for Disease Control and Prevention (CDC): The CDC provides updated news about the disease and travel advice. The website also tells you how to prevent the spread of infection. www.cdc.gov  · World Health Organization [facility]): WHO offers information about the virus outbreaks. WHO also has travel advice. www.who.int  Current as of: July 10, 2020               Content Version: 12.6  © 2006-2020 [employer]. Care instructions adapted under license by [facility] ([facility]). If you have questions about a medical condition or this instruction, always ask your healthcare professional. [place] [address] any warranty or liability for your use of this information.          Patient Education        Coronavirus (MXTHI-10): Care Instructions  Overview The coronavirus disease (COVID-19) is caused by a virus. Symptoms may include a fever, a cough, and shortness of breath. It mainly spreads person-to-person through droplets from coughing and sneezing. The virus also can spread when people are in close contact with someone who is infected. Most people have mild symptoms and can take care of themselves at home. If their symptoms get worse, they may need care in a hospital. Treatment may include medicines to reduce symptoms, plus breathing support such as oxygen therapy or a ventilator. It's important to not spread the virus to others. If you have COVID-19, wear a face cover anytime you are around other people. You need to isolate yourself while you are sick. Leave your home only if you need to get medical care or testing. Follow-up care is a key part of your treatment and safety. Be sure to make and go to all appointments, and call your doctor if you are having problems. It's also a good idea to know your test results and keep a list of the medicines you take. How can you care for yourself at home? · Get extra rest. It can help you feel better. · Drink plenty of fluids. This helps replace fluids lost from fever. Fluids also help ease a scratchy throat. Water, soup, fruit juice, and hot tea with lemon are good choices. · Take acetaminophen (such as Tylenol) to reduce a fever. It may also help with muscle aches. Read and follow all instructions on the label. · Use petroleum jelly on sore skin. This can help if the skin around your nose and lips becomes sore from rubbing a lot with tissues. Tips for self-isolation  · Limit contact with people in your home. If possible, stay in a separate bedroom and use a separate bathroom. · Wear a cloth face cover when you are around other people. It can help stop the spread of the virus when you cough or sneeze. · If you have to leave home, avoid crowds and try to stay at least 6 feet away from other people. · Avoid contact with pets and other animals. · Cover your mouth and nose with a tissue when you cough or sneeze. Then throw it in the trash right away. · Wash your hands often, especially after you cough or sneeze. Use soap and water, and scrub for at least 20 seconds. If soap and water aren't available, use an alcohol-based hand . · Don't share personal household items. These include bedding, towels, cups and glasses, and eating utensils. · [address] in the warmest water allowed for the fabric type, and dry it completely. It's okay to wash other people's laundry with yours. · Clean and disinfect your home every day. Use household  and disinfectant wipes or sprays. Take special care to clean things that you grab with your hands. These include doorknobs, remote controls, phones, and handles on your refrigerator and microwave. And don't forget countertops, tabletops, bathrooms, and computer keyboards. When you can end self-isolation  · If you know or suspect that you have COVID-19, stay in self-isolation until:  ? You haven't had a fever for 3 days, and  ? Your symptoms have improved, and  ? It's been at least 10 days since your symptoms started. · Talk to your doctor about whether you also need testing, especially if you have a weakened immune system. When should you call for help? Call 911 anytime you think you may need emergency care. For example, call if you have life-threatening symptoms, such as:    · You have severe trouble breathing. (You can't talk at all.)     · You have constant chest pain or pressure.     · You are severely dizzy or lightheaded.     · You are confused or can't think clearly.     · Your face and lips have a blue color.     · You pass out (lose consciousness) or are very hard to wake up. Call your doctor now or seek immediate medical care if:    · You have moderate trouble breathing.  (You can't speak a full sentence.)   · You are coughing up blood (more than about 1 teaspoon).     · You have signs of low blood pressure. These include feeling lightheaded; being too weak to stand; and having cold, pale, clammy skin. Watch closely for changes in your health, and be sure to contact your doctor if:    · Your symptoms get worse.     · You are not getting better as expected. Call before you go to the doctor's office. Follow their instructions. And wear a cloth face cover. Current as of: July 10, 2020               Content Version: 12.6  © 2006-2020 [facility]. Care instructions adapted under license by [facility] ([facility]). If you have questions about a medical condition or this instruction, always ask your healthcare professional. [address] any warranty or liability for your use of this information.

## 2020-12-28 NOTE — TELEPHONE ENCOUNTER
Reason for Disposition   Patient wants to be seen    Answer Assessment - Initial Assessment Questions  1. LOCATION: \"Where is the swollen node located? \" \"Is the matching node on the other side of the body also swollen? \"       Right side of neck. Denies swelling on the left side    2. SIZE: \"How big is the node? \" (Inches or centimeters) (or compare to common objects such as pea, bean, marble, golf ball)       About the size of a marble    3. ONSET: \"When did the swelling start? \"       Started 10 days ago    4. NECK NODES: \"Is there a sore throat, runny nose or other symptoms of a cold? \"       Sore throat that comes and goes. 5. GROIN OR ARMPIT NODES: \"Is there a sore, scratch, cut or painful red area on that arm or leg? \"       Denies    6. FEVER: \"Do you have a fever? \" If so, ask: \"What is it, how was it measured, and when did it start? \"       Denies    7. CAUSE: \"What do you think is causing the swollen lymph nodes? \"      Infection or strep    8. OTHER SYMPTOMS: \"Do you have any other symptoms? \"      Denies    9. PREGNANCY: \"Is there any chance you are pregnant? \" \"When was your last menstrual period? \"      Yes. States last period 12/23/20 took test that was negative    Protocols used: LYMPH NODES - [facility]    Patient called pre-service center [facility] with red flag complaint. Brief description of triage: see above    Triage indicates for patient toto be seen by pcp today or tomorrow    Care advice provided, patient verbalizes understanding; denies any other questions or concerns; instructed to call back for any new or worsening symptoms. Writer provided warm transfer to [facility] for appointment scheduling. Attention Provider: Thank you for allowing me to participate in the care of your patient. The patient was connected to triage in response to information provided to the [facility].  Please do not respond through this encounter as the response is not directed to a shared pool.

## 2020-12-28 NOTE — PATIENT INSTRUCTIONS

## 2020-12-28 NOTE — PROGRESS NOTES
SUBJECTIVE:    Patient ID:  Amaury Frankel is a 23 y.o. female      This visit was conducted virtually for concerns of sore throat, swollen lymph node. States she was positive for COVID-19 in July. 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 Pharyngitis  This is a new problem. Episode onset: 2 weeks. The problem occurs intermittently. The problem has been gradually improving. Associated symptoms include arthralgias, fatigue, headaches, myalgias and a sore throat. Pertinent negatives include no abdominal pain, chest pain, chills, congestion, coughing, fever, nausea, rash or vomiting. The symptoms are aggravated by drinking and eating. She has tried nothing for the symptoms. No current outpatient medications on file prior to visit. No current facility-administered medications on file prior to visit. Past Medical History:   Diagnosis Date    Allergic rhinitis      History reviewed. No pertinent surgical history. History reviewed. No pertinent family history.   Social History     Socioeconomic History    Marital status: Single     Spouse name: Not on file    Number of children: Not on file    Years of education: Not on file    Highest education level: Not on file   Occupational History    Not on file   Social Needs    Financial resource strain: Not on file    Food insecurity     Worry: Not on file     Inability: Not on file    Transportation needs     Medical: Not on file     Non-medical: Not on file   Tobacco Use    Smoking status: Never Smoker    Smokeless tobacco: Never Used   Substance and Sexual Activity    Alcohol use: No     Alcohol/week: 0.0 standard drinks    Drug use: No    Sexual activity: Yes     Partners: Male     Birth control/protection: Pill   Lifestyle    Physical activity     Days per week: Not on file     Minutes per session: Not on file    Stress: Not on file   Relationships    Social connections     Talks on phone: Not on file Gets together: Not on file     Attends [employer] service: Not on file     Active member of club or organization: Not on file     Attends meetings of clubs or organizations: Not on file     Relationship status: Not on file    Intimate partner violence     Fear of current or ex partner: Not on file     Emotionally abused: Not on file     Physically abused: Not on file     Forced sexual activity: Not on file   Other Topics Concern    Not on file   Social History Narrative    Not on file       Review of Systems   Constitutional: Positive for fatigue. Negative for chills and fever. HENT: Positive for postnasal drip and sore throat. Negative for congestion and rhinorrhea. Eyes: Negative for visual disturbance. Respiratory: Negative for cough, chest tightness, shortness of breath and wheezing. Cardiovascular: Negative for chest pain, palpitations and leg swelling. Gastrointestinal: Negative for abdominal pain, constipation, diarrhea, nausea and vomiting. Musculoskeletal: Positive for arthralgias and myalgias. Skin: Negative for rash. Neurological: Positive for headaches. OBJECTIVE:    Physical Exam  Vitals signs and nursing note reviewed. Constitutional:       General: She is not in acute distress. Appearance: Normal appearance. She is not ill-appearing, toxic-appearing or diaphoretic. HENT:      Head: Normocephalic and atraumatic. Right Ear: External ear normal.      Left Ear: External ear normal.      Nose: Nose normal.      Mouth/Throat:      Mouth: Mucous membranes are moist.   Eyes:      Extraocular Movements: Extraocular movements intact. Conjunctiva/sclera: Conjunctivae normal.      Pupils: Pupils are equal, round, and reactive to light. Neck:      Musculoskeletal: Normal range of motion. Pulmonary:      Effort: Pulmonary effort is normal. No respiratory distress. Musculoskeletal: Normal range of motion.    Lymphadenopathy: Cervical: Cervical adenopathy present. Right cervical: Superficial cervical adenopathy (per patient) present. Skin:     General: Skin is dry. Coloration: Skin is not pale. Findings: No rash. Neurological:      Mental Status: She is alert and oriented to person, place, and time. Psychiatric:         Mood and Affect: Mood normal.         Behavior: Behavior normal.       There were no vitals taken for this visit. BP Readings from Last 3 Encounters:   10/04/19 122/78   09/25/19 120/84   09/20/19 110/70      Wt Readings from Last 3 Encounters:   10/04/19 144 lb 9.6 oz (65.6 kg)   09/25/19 144 lb (65.3 kg)   09/20/19 142 lb (64.4 kg)       ASSESSMENT & PLAN:    1. COVID-19 ruled out  - COVID-19 Ambulatory; Future    2. Educated about COVID-19 virus infection  - Discussed and educated about COVID-19    3. Sore throat  - POCT rapid strep A   - Culture positive for strep G  - Amoxicillin 500 mg TID for 10 days sent into pharmacy     4. Generalized body aches  - POCT Influenza A/B (neagative)     5. Lymphadenitis, acute  - Throat culture positive for strep      Continue current treatment plan. Current Outpatient Medications   Medication Sig Dispense Refill    amoxicillin-clavulanate (AUGMENTIN) 500-125 MG per tablet Take 1 tablet by mouth 2 times daily for 7 days 14 tablet 0     No current facility-administered medications for this visit. Return if symptoms worsen or fail to improve, for Rule out Covid-19, sore throat, generalized body aches, lymphadnitis. [facility] received counseling on the following healthy behaviors: nutrition, exercise and medication adherence    Discussed use, benefit, and side effects of prescribed medications. Barriers to medication compliance addressed. All patient questions answered. Pt voiced understanding. Call office if experience side effects from medications.

## 2020-12-30 LAB
ORGANISM: ABNORMAL
THROAT CULTURE: ABNORMAL
THROAT CULTURE: ABNORMAL

## 2020-12-30 RX ORDER — AMOXICILLIN AND CLAVULANATE POTASSIUM 500; 125 MG/1; MG/1
1 TABLET, FILM COATED ORAL 2 TIMES DAILY
Qty: 14 TABLET | Refills: 0 | Status: SHIPPED
Start: 2020-12-30 | End: 2020-12-30 | Stop reason: CLARIF

## 2020-12-30 RX ORDER — AMOXICILLIN 500 MG/1
500 CAPSULE ORAL 3 TIMES DAILY
Qty: 30 CAPSULE | Refills: 0 | Status: SHIPPED | OUTPATIENT
Start: 2020-12-30 | End: 2021-01-09

## 2020-12-30 NOTE — RESULT ENCOUNTER NOTE
Pt advised negative for influenza and covid but positive for strep  [facility] [address]

## 2021-01-19 ENCOUNTER — VIRTUAL VISIT (OUTPATIENT)
Dept: FAMILY MEDICINE CLINIC | Age: 26
End: 2021-01-19
Payer: COMMERCIAL

## 2021-01-19 DIAGNOSIS — J02.0 STREP THROAT: Primary | ICD-10-CM

## 2021-01-19 DIAGNOSIS — B37.0 THRUSH: ICD-10-CM

## 2021-01-19 PROCEDURE — 99213 OFFICE O/P EST LOW 20 MIN: CPT | Performed by: FAMILY MEDICINE

## 2021-01-19 ASSESSMENT — ENCOUNTER SYMPTOMS
DIARRHEA: 0
CHEST TIGHTNESS: 0
SHORTNESS OF BREATH: 0
COUGH: 0
SORE THROAT: 0
ABDOMINAL PAIN: 0

## 2021-01-19 ASSESSMENT — PATIENT HEALTH QUESTIONNAIRE - PHQ9
SUM OF ALL RESPONSES TO PHQ QUESTIONS 1-9: 0
SUM OF ALL RESPONSES TO PHQ QUESTIONS 1-9: 0
2. FEELING DOWN, DEPRESSED OR HOPELESS: 0
1. LITTLE INTEREST OR PLEASURE IN DOING THINGS: 0

## 2021-01-19 NOTE — PROGRESS NOTES
2021    TELEHEALTH EVALUATION -- Audio/Visual (During QSC-22 public health emergency)    HPI:    Roscoe Kathleen (:  1995) has requested an audio/video evaluation for the following concern(s):    Pt is here today by video VV for fu on strep throat and co a white tongue. She was evaluated for ST, swollen glands, fatigue fever/chills 2-3 weeks ago and dx with strep throat. She had tested negative for Flu and COVID at that time. She was treated with Amoxil for 10 d which she has finished about a week ago. She was feeling well but has noticed some fatigue and white film on tongue which has gotten worse. No ST, fever/ chills. Swollen glands have resolved. No known exposure. Review of Systems   Constitutional: Positive for fatigue. Negative for activity change, appetite change, chills, fever and unexpected weight change. HENT: Negative for congestion and sore throat. Respiratory: Negative for cough, chest tightness and shortness of breath. Cardiovascular: Negative for chest pain. Gastrointestinal: Negative for abdominal pain and diarrhea. Musculoskeletal: Negative for arthralgias and myalgias. Skin: Negative for rash. Neurological: Negative for light-headedness and headaches. Hematological: Negative for adenopathy. Does not bruise/bleed easily. Prior to Visit Medications    Medication Sig Taking? Authorizing Provider   nystatin (MYCOSTATIN) [id number] UNIT/ML suspension Take 5 mLs by mouth 4 times daily for 14 days Yes James Linton MD       Social History     Tobacco Use    Smoking status: Never Smoker    Smokeless tobacco: Never Used   Substance Use Topics    Alcohol use: No     Alcohol/week: 0.0 standard drinks    Drug use: No        Past Medical History:   Diagnosis Date    Allergic rhinitis    , No past surgical history on file.,   Social History     Tobacco Use    Smoking status: Never Smoker    Smokeless tobacco: Never Used   Substance Use Topics    Alcohol use:  No Alcohol/week: 0.0 standard drinks    Drug use: No       PHYSICAL EXAMINATION:  [ INSTRUCTIONS:  \"[x]\" Indicates a positive item  \"[]\" Indicates a negative item  -- DELETE ALL ITEMS NOT EXAMINED]  Vital Signs: (As obtained by patient/caregiver or practitioner observation)    Blood pressure-  Heart rate-    Respiratory rate-    Temperature-  Pulse oximetry-     Constitutional: [x] Appears well-developed and well-nourished [x] No apparent distress      [] Abnormal-   Mental status  [x] Alert and awake  [x] Oriented to person/place/time []Able to follow commands      Eyes:  EOM    [x]  Normal  [] Abnormal-  Sclera  []  Normal  [] Abnormal -         Discharge []  None visible  [] Abnormal -    HENT:   [x] Normocephalic, atraumatic. [] Abnormal   [x] Mouth/Throat: Mucous membranes are moist. There is a white patch on tongue and smaller patches on palate. Tonsils do not seen erythematous on enlarged. Mouth- moist mm. There is white patch on tongue and palate  External Ears [] Normal  [] Abnormal-     Neck: [] No visualized mass     Pulmonary/Chest: [x] Respiratory effort normal.  [x] No visualized signs of difficulty breathing or respiratory distress        [] Abnormal-      Musculoskeletal:   [] Normal gait with no signs of ataxia         [] Normal range of motion of neck        [] Abnormal-       Neurological:        [x] No Facial Asymmetry (Cranial nerve 7 motor function) (limited exam to video visit)          [] No gaze palsy        [] Abnormal-         Skin:        [x] No significant exanthematous lesions or discoloration noted on facial skin         [] Abnormal-            Psychiatric:       [x] Normal Affect [] No Hallucinations        [] Abnormal-     Other pertinent observable physical exam findings-     ASSESSMENT/PLAN:  1. Strep throat  Reviewed history. Sx resolved  Encouraged appr hydration and rest     2. Thrush  Nystatin for 1 week  Re evaluate if sx persist in 1-2 weeks      No follow-ups on file.

## 2021-03-03 ENCOUNTER — IMMUNIZATION (OUTPATIENT)
Dept: PRIMARY CARE CLINIC | Age: 26
End: 2021-03-03
Payer: COMMERCIAL

## 2021-03-03 PROCEDURE — 91301 COVID-19, MODERNA VACCINE 100MCG/0.5ML DOSE: CPT | Performed by: FAMILY MEDICINE

## 2021-03-03 PROCEDURE — 0011A COVID-19, MODERNA VACCINE 100MCG/0.5ML DOSE: CPT | Performed by: FAMILY MEDICINE

## 2021-03-31 ENCOUNTER — IMMUNIZATION (OUTPATIENT)
Dept: PRIMARY CARE CLINIC | Age: 26
End: 2021-03-31
Payer: COMMERCIAL

## 2021-03-31 PROCEDURE — 91301 COVID-19, MODERNA VACCINE 100MCG/0.5ML DOSE: CPT | Performed by: FAMILY MEDICINE

## 2021-03-31 PROCEDURE — 0012A COVID-19, MODERNA VACCINE 100MCG/0.5ML DOSE: CPT | Performed by: FAMILY MEDICINE

## 2021-04-05 ENCOUNTER — NURSE TRIAGE (OUTPATIENT)
Dept: OTHER | Facility: CLINIC | Age: 26
End: 2021-04-05

## 2021-04-05 ENCOUNTER — OFFICE VISIT (OUTPATIENT)
Dept: FAMILY MEDICINE CLINIC | Age: 26
End: 2021-04-05
Payer: COMMERCIAL

## 2021-04-05 VITALS
DIASTOLIC BLOOD PRESSURE: 70 MMHG | SYSTOLIC BLOOD PRESSURE: 110 MMHG | BODY MASS INDEX: 23.48 KG/M2 | OXYGEN SATURATION: 100 % | WEIGHT: 136.8 LBS | HEART RATE: 81 BPM | TEMPERATURE: 97.7 F | RESPIRATION RATE: 16 BRPM

## 2021-04-05 DIAGNOSIS — H81.13 BENIGN PAROXYSMAL POSITIONAL VERTIGO DUE TO BILATERAL VESTIBULAR DISORDER: Primary | ICD-10-CM

## 2021-04-05 PROCEDURE — 99213 OFFICE O/P EST LOW 20 MIN: CPT | Performed by: FAMILY MEDICINE

## 2021-04-05 RX ORDER — METHYLPREDNISOLONE 4 MG/1
TABLET ORAL
Qty: 1 KIT | Refills: 0 | Status: SHIPPED | OUTPATIENT
Start: 2021-04-05 | End: 2021-04-11

## 2021-04-05 RX ORDER — MECLIZINE HYDROCHLORIDE 25 MG/1
25 TABLET ORAL 2 TIMES DAILY PRN
Qty: 12 TABLET | Refills: 0 | Status: SHIPPED | OUTPATIENT
Start: 2021-04-05 | End: 2021-04-15

## 2021-04-05 NOTE — PATIENT INSTRUCTIONS
Patient Education        Benign Paroxysmal Positional Vertigo (BPPV): Care Instructions  Your Care Instructions     Benign paroxysmal positional vertigo, also called BPPV, is an inner ear problem. It causes a spinning or whirling sensation when you move your head. This sensation is called vertigo. The vertigo usually lasts for less than a minute. People often have vertigo spells for a few days or weeks. Then the vertigo goes away. But it may come back again. The vertigo may be mild, or it may be bad enough to cause unsteadiness, nausea, and vomiting. When you move, your inner ear sends messages to the brain. This helps you keep your balance. Vertigo can happen when debris builds up in the inner ear. The buildup can cause the inner ear to send the wrong message to the brain. Your doctor may move you in different positions to help your vertigo get better faster. This is called the Epley maneuver. Your doctor may also prescribe medicines or exercises to help with your symptoms. Follow-up care is a key part of your treatment and safety. Be sure to make and go to all appointments, and call your doctor if you are having problems. It's also a good idea to know your test results and keep a list of the medicines you take. How can you care for yourself at home? · If your doctor suggests that you do Dover-Daroff exercises:  ? Sit on the edge of a bed or sofa. Quickly lie down on the side that causes the worst vertigo. Lie on your side with your ear down. ? Stay in this position for at least 30 seconds or until the vertigo goes away. ? Sit up. If this causes vertigo, wait for it to stop. ? Repeat the procedure on the other side. ? Repeat this 10 times. Do these exercises 2 times a day until the vertigo is gone. When should you call for help? Call 911 anytime you think you may need emergency care. For example, call if:    · You have symptoms of a stroke.  These may include:  ? Sudden numbness, tingling, weakness, or loss of movement in your face, arm, or leg, especially on only one side of your body. ? Sudden vision changes. ? Sudden trouble speaking. ? Sudden confusion or trouble understanding simple statements. ? Sudden problems with walking or balance. ? A sudden, severe headache that is different from past headaches. Call your doctor now or seek immediate medical care if:    · You have new or worse nausea and vomiting.     · You have new symptoms such as hearing loss or roaring in your ears. Watch closely for changes in your health, and be sure to contact your doctor if:    · You are not getting better as expected.     · Your vertigo gets worse. Where can you learn more? Go to https://Tantalinepepiceweb.[employer]. org and sign in to your [employer] account. Enter  in the [employer] box to learn more about \"Benign Paroxysmal Positional Vertigo (BPPV): Care Instructions. \"     If you do not have an account, please click on the \"Sign Up Now\" link. Current as of: December 2, 2020               Content Version: 12.8  © 4122-9424 Healthwise, Incorporated. Care instructions adapted under license by [facility] ([facility]). If you have questions about a medical condition or this instruction, always ask your healthcare professional. [place] [address] any warranty or liability for your use of this information.

## 2021-04-05 NOTE — PROGRESS NOTES
Subjective:       Pam Vazquez is a 23 y.o. female who presents for evaluation dizziness. The symptoms started 1 day ago and are unchanged. The attacks occur every time with side turning her head to left or right while she was laying down this morning  and last a few seconds. Positions that worsen symptoms: rolling in bed to the left and rolling in bed to the right. Previous workup/treatments: none. Associated ear symptoms: none. Associated CNS symptoms: none. Recent infections: none, occasional seasonal allergy symptoms. Head trauma: denied. Denies chest pain shortness of breath or palpitations  Patient's medications, allergies, past medical, surgical, social and family histories were reviewed and updated as appropriate. Review of Systems  Pertinent items are noted in HPI. Objective:      /70 (Site: Right Upper Arm, Position: Sitting, Cuff Size: Medium Adult)   Pulse 81   Temp 97.7 °F (36.5 °C) (Temporal)   Resp 16   Wt 136 lb 12.8 oz (62.1 kg)   LMP 03/05/2021 (Exact Date)   SpO2 100%   BMI 23.48 kg/m²   General appearance: alert, appears stated age and cooperative  Eyes: conjunctivae/corneas clear. PERRL, EOM's intact. Fundi benign. Ears: normal TM's and external ear canals both ears  Throat: lips, mucosa, and tongue normal; teeth and gums normal  Neck: no adenopathy, supple, symmetrical, trachea midline and thyroid not enlarged, symmetric, no tenderness/mass/nodules  Lungs: clear to auscultation bilaterally  Heart: regular rate and rhythm  Abdomen: soft, non-tender; bowel sounds normal; no masses,  no organomegaly  Neurologic: Alert and oriented X 3, normal strength and tone. Normal symmetric reflexes. Normal coordination and gait  Mental status: Alert, oriented, thought content appropriate  Cranial nerves: normal  Gait: Normal        Assessment:      Benign positional vertigo      Plan:      Meclizine per med orders. Steroids per med orders. Follow up: 1 week.   Written information on benign positional vertigo given

## 2021-06-14 RX ORDER — FLUCONAZOLE 150 MG/1
150 TABLET ORAL ONCE
Qty: 1 TABLET | Refills: 0 | OUTPATIENT
Start: 2021-06-14 | End: 2021-06-14

## 2021-06-14 NOTE — TELEPHONE ENCOUNTER
Medial to get prescribed this for make it in the past but please advise her that it is over-the-counter she can purchase it at any pharmacy

## 2021-06-14 NOTE — TELEPHONE ENCOUNTER
Pt called stating she has a yeast infection, asking for diflucan to be sent to pharmacy.  Please advise

## 2021-06-17 ASSESSMENT — ENCOUNTER SYMPTOMS
WHEEZING: 0
NAUSEA: 0
ABDOMINAL PAIN: 0
CHEST TIGHTNESS: 0
SHORTNESS OF BREATH: 0
COUGH: 0
CONSTIPATION: 0
DIARRHEA: 0
VOMITING: 0

## 2021-06-17 NOTE — PROGRESS NOTES
SUBJECTIVE:    Patient ID:  Zion Clark is a 23 y.o. female      Patient is here for an acute visit for concerns of vaginal itching, possibly chlamydia. States she tried Monistat 1 day treatment which helped for a while, but symptoms returned. Vaginal Itching  The patient's primary symptoms include genital itching. The patient's pertinent negatives include no genital odor, pelvic pain, vaginal bleeding or vaginal discharge. This is a new problem. The current episode started in the past 7 days. The problem occurs constantly. The problem has been unchanged. The patient is experiencing no pain. She is pregnant. Pertinent negatives include no abdominal pain, anorexia, chills, constipation, diarrhea, fever, flank pain, headaches, joint swelling, nausea, rash or vomiting. Painful intercourse: burning. Nothing aggravates the symptoms. Treatments tried: Monistat. The treatment provided moderate relief. She is sexually active. It is possible that her partner has an STD. She uses nothing for contraception. Her past medical history is significant for an STD (Chlamydia). (Vaginal yeast infections)       No current outpatient medications on file prior to visit. No current facility-administered medications on file prior to visit. Past Medical History:   Diagnosis Date    Allergic rhinitis      History reviewed. No pertinent surgical history. History reviewed. No pertinent family history.   Social History     Socioeconomic History    Marital status: Single     Spouse name: Not on file    Number of children: Not on file    Years of education: Not on file    Highest education level: Not on file   Occupational History    Not on file   Tobacco Use    Smoking status: Never Smoker    Smokeless tobacco: Never Used   Substance and Sexual Activity    Alcohol use: No     Alcohol/week: 0.0 standard drinks    Drug use: No    Sexual activity: Yes     Partners: Male     Birth control/protection: Pill   Other Topics Concern    Not on file   Social History Narrative    Not on file     Social Determinants of Health     Financial Resource Strain:     Difficulty of Paying Living Expenses:    Food Insecurity:     Worried About Running Out of Food in the Last Year:     [address] in the Last Year:    Transportation Needs:     Lack of Transportation (Medical):  Lack of Transportation (Non-Medical):    Physical Activity:     Days of Exercise per Week:     Minutes of Exercise per Session:    Stress:     Feeling of Stress :    Social Connections:     Frequency of Communication with Friends and Family:     Frequency of Social Gatherings with Friends and Family:     Attends Judaism Services:     Active Member of Clubs or Organizations:     Attends Club or Organization Meetings:     Marital Status:    Intimate Partner Violence:     Fear of Current or Ex-Partner:     Emotionally Abused:     Physically Abused:     Sexually Abused:        Review of Systems   Constitutional: Negative for chills and fever. Eyes: Negative for visual disturbance. Respiratory: Negative for cough, chest tightness, shortness of breath and wheezing. Cardiovascular: Negative for chest pain, palpitations and leg swelling. Gastrointestinal: Negative for abdominal pain, anorexia, constipation, diarrhea, nausea and vomiting. Genitourinary: Negative for flank pain, pelvic pain and vaginal discharge. Musculoskeletal: Negative for arthralgias and myalgias. Skin: Negative for rash. Neurological: Negative for headaches. OBJECTIVE:    Physical Exam  Vitals and nursing note reviewed. Constitutional:       General: She is not in acute distress. Appearance: She is well-developed. HENT:      Head: Normocephalic and atraumatic.       Right Ear: External ear normal.      Left Ear: External ear normal.      Nose: Nose normal.   Eyes:      Conjunctiva/sclera: Conjunctivae normal.      Pupils: Pupils are equal, round, and reactive to light. Neck:      Trachea: No tracheal deviation. Cardiovascular:      Rate and Rhythm: Normal rate and regular rhythm. Heart sounds: Normal heart sounds. Pulmonary:      Effort: Pulmonary effort is normal. No respiratory distress. Breath sounds: Normal breath sounds. No wheezing or rales. Chest:      Chest wall: No tenderness. Abdominal:      General: Bowel sounds are normal. There is no distension. Palpations: Abdomen is soft. Tenderness: There is no abdominal tenderness. Musculoskeletal:         General: Normal range of motion. Cervical back: Normal range of motion and neck supple. Skin:     General: Skin is warm and dry. Findings: No rash. Neurological:      Mental Status: She is alert and oriented to person, place, and time. Psychiatric:         Behavior: Behavior normal.       /74   Pulse 76   Temp 98.2 °F (36.8 °C)   Ht 5' 4\" (1.626 m)   Wt 136 lb (61.7 kg)   BMI 23.34 kg/m²    BP Readings from Last 3 Encounters:   06/18/21 112/74   04/05/21 110/70   10/04/19 122/78      Wt Readings from Last 3 Encounters:   06/18/21 136 lb (61.7 kg)   04/05/21 136 lb 12.8 oz (62.1 kg)   10/04/19 144 lb 9.6 oz (65.6 kg)       ASSESSMENT & PLAN:    1. Acute vaginitis  - POCT Urinalysis no Micro (WNL)  - POCT urine pregnancy (negative)  - VAGINAL PATHOGENS PROBE *A (pending)    2. Vaginal itching  - fluconazole (DIFLUCAN) 150 MG tablet; Take one tablet today, may repeat in 3 days  Dispense: 2 tablet; Refill: 0  - VAGINAL PATHOGENS PROBE *A (penidng)    3. Possible exposure to STD  - C.trachomatis N.gonorrhoeae DNA, Urine; Future (pending)      Continue current treatment plan. Current Outpatient Medications   Medication Sig Dispense Refill    fluconazole (DIFLUCAN) 150 MG tablet Take one tablet today, may repeat in 3 days 2 tablet 0     No current facility-administered medications for this visit.       Return if symptoms worsen or fail to improve, for acute vaginitis, vaginal itching, possible STD. Alyson Saleem received counseling on the following healthy behaviors: nutrition, exercise and medication adherence    Patient given educational materials on vaginitis    Discussed use, benefit, and side effects of prescribed medications. Barriers to medication compliance addressed. All patient questions answered. Pt voiced understanding. Call office if experience side effects from medications. Please note that some or all of this record was generated using voice recognition software. If there are any questions about the content of this document, please contact the author as some errors in transcription may have occurred.

## 2021-06-17 NOTE — PATIENT INSTRUCTIONS
UA and pregnancy today     Cultures pending    Discussed safe sex practices    Diflucan 150 mg once, may repeat in 3 days    Follow-up if symptoms worsen or persist  Patient Education        Bacterial Vaginosis: Care Instructions  Overview     Bacterial vaginosis is a type of vaginal infection. It is caused by excess growth of certain bacteria that are normally found in the vagina. Symptoms can include itching, swelling, pain when you urinate or have sex, and a gray or yellow discharge with a \"fishy\" odor. It is not considered an infection that is spread through sexual contact. Symptoms can be annoying and uncomfortable. But bacterial vaginosis does not usually cause other health problems. However, if you have it while you are pregnant, it can cause complications. While the infection may go away on its own, most doctors use antibiotics to treat it. You may have been prescribed pills or vaginal cream. With treatment, bacterial vaginosis usually clears up in 5 to 7 days. Follow-up care is a key part of your treatment and safety. Be sure to make and go to all appointments, and call your doctor if you are having problems. It's also a good idea to know your test results and keep a list of the medicines you take. How can you care for yourself at home? · Take your antibiotics as directed. Do not stop taking them just because you feel better. You need to take the full course of antibiotics. · Do not eat or drink anything that contains alcohol if you are taking metronidazole or tinidazole. · Keep using your medicine if you start your period. Use pads instead of tampons while using a vaginal cream or suppository. Tampons can absorb the medicine. · Wear loose cotton clothing. Do not wear nylon and other materials that hold body heat and moisture close to the skin. · Do not scratch. Relieve itching with a cold pack or a cool bath. · Do not wash your vaginal area more than once a day.  Use plain water or a mild, unscented soap. Do not douche. When should you call for help? Watch closely for changes in your health, and be sure to contact your doctor if:    · You have unexpected vaginal bleeding.     · You have a fever.     · You have new or increased pain in your vagina or pelvis.     · You are not getting better after 1 week.     · Your symptoms return after you finish the course of your medicine. Where can you learn more? Go to [email or web address]://Rockit [email or web address].[employer]. org and sign in to your [employer] account. Enter H387 in the [employer] box to learn more about \"Bacterial Vaginosis: Care Instructions. \"     If you do not have an account, please click on the \"Sign Up Now\" link. Current as of: February 11, 2021               Content Version: 12.9  © 7616-5667 Healthwise, Incorporated. Care instructions adapted under license by [facility] ([facility]). If you have questions about a medical condition or this instruction, always ask your healthcare professional. [place] [address] any warranty or liability for your use of this information.

## 2021-06-18 ENCOUNTER — OFFICE VISIT (OUTPATIENT)
Dept: FAMILY MEDICINE CLINIC | Age: 26
End: 2021-06-18
Payer: COMMERCIAL

## 2021-06-18 VITALS
HEIGHT: 64 IN | DIASTOLIC BLOOD PRESSURE: 74 MMHG | HEART RATE: 76 BPM | WEIGHT: 136 LBS | BODY MASS INDEX: 23.22 KG/M2 | TEMPERATURE: 98.2 F | SYSTOLIC BLOOD PRESSURE: 112 MMHG

## 2021-06-18 DIAGNOSIS — Z20.2 POSSIBLE EXPOSURE TO STD: ICD-10-CM

## 2021-06-18 DIAGNOSIS — N89.8 VAGINAL ITCHING: ICD-10-CM

## 2021-06-18 DIAGNOSIS — N76.0 ACUTE VAGINITIS: Primary | ICD-10-CM

## 2021-06-18 LAB
BILIRUBIN, POC: NORMAL
BLOOD URINE, POC: NORMAL
CLARITY, POC: CLEAR
COLOR, POC: YELLOW
CONTROL: NORMAL
GLUCOSE URINE, POC: NORMAL
KETONES, POC: NORMAL
LEUKOCYTE EST, POC: NORMAL
NITRITE, POC: NORMAL
PH, POC: 6
PREGNANCY TEST URINE, POC: NEGATIVE
PROTEIN, POC: NORMAL
SPECIFIC GRAVITY, POC: 1.03
UROBILINOGEN, POC: NORMAL

## 2021-06-18 PROCEDURE — 81025 URINE PREGNANCY TEST: CPT | Performed by: CLINICAL NURSE SPECIALIST

## 2021-06-18 PROCEDURE — 99214 OFFICE O/P EST MOD 30 MIN: CPT | Performed by: CLINICAL NURSE SPECIALIST

## 2021-06-18 PROCEDURE — 81002 URINALYSIS NONAUTO W/O SCOPE: CPT | Performed by: CLINICAL NURSE SPECIALIST

## 2021-06-18 RX ORDER — FLUCONAZOLE 150 MG/1
TABLET ORAL
Qty: 2 TABLET | Refills: 0 | Status: SHIPPED | OUTPATIENT
Start: 2021-06-18

## 2021-06-19 LAB
CANDIDA SPECIES, DNA PROBE: NORMAL
GARDNERELLA VAGINALIS, DNA PROBE: NORMAL
TRICHOMONAS VAGINALIS DNA: NORMAL

## 2021-06-21 ENCOUNTER — TELEPHONE (OUTPATIENT)
Dept: FAMILY MEDICINE CLINIC | Age: 26
End: 2021-06-21

## 2021-06-21 NOTE — TELEPHONE ENCOUNTER
Patient calling stating she went to [facility] Saturday to do urinalysis for gonorrheae and trachomatis but lab was closed. Patient works 7-7 unsure what to do. Results in also, provider wanted to speak to patient transferred call to [address] [facility]

## 2021-06-22 ENCOUNTER — TELEPHONE (OUTPATIENT)
Dept: FAMILY MEDICINE CLINIC | Age: 26
End: 2021-06-22

## 2021-06-22 NOTE — TELEPHONE ENCOUNTER
----- Message from Edwige Herr sent at 6/22/2021  5:27 PM EDT -----  Subject: Message to Provider    QUESTIONS  Information for Provider? pt received a call earlier today without a voice   message and was just returning the call back  ---------------------------------------------------------------------------  --------------  [address]  What is the best way for the office to contact you? OK to leave message on   voicemail  Preferred Call Back Phone Number? [phone]  ---------------------------------------------------------------------------  --------------  SCRIPT ANSWERS  Relationship to Patient?  Self

## 2021-06-24 ENCOUNTER — NURSE ONLY (OUTPATIENT)
Dept: FAMILY MEDICINE CLINIC | Age: 26
End: 2021-06-24

## 2021-06-24 VITALS — TEMPERATURE: 97.7 F

## 2021-06-24 DIAGNOSIS — Z20.2 POSSIBLE EXPOSURE TO STD: ICD-10-CM

## 2021-06-24 DIAGNOSIS — Z20.2 POSSIBLE EXPOSURE TO STD: Primary | ICD-10-CM

## 2021-06-25 LAB
C. TRACHOMATIS DNA ,URINE: NEGATIVE
N. GONORRHOEAE DNA, URINE: NEGATIVE

## 2021-09-28 ENCOUNTER — OFFICE VISIT (OUTPATIENT)
Dept: FAMILY MEDICINE CLINIC | Age: 26
End: 2021-09-28
Payer: COMMERCIAL

## 2021-09-28 VITALS
HEART RATE: 74 BPM | SYSTOLIC BLOOD PRESSURE: 100 MMHG | BODY MASS INDEX: 23.69 KG/M2 | WEIGHT: 138 LBS | DIASTOLIC BLOOD PRESSURE: 70 MMHG | TEMPERATURE: 97.8 F | OXYGEN SATURATION: 99 %

## 2021-09-28 DIAGNOSIS — L29.9 PRURITIC DERMATITIS: ICD-10-CM

## 2021-09-28 DIAGNOSIS — L23.9 ALLERGIC CONTACT DERMATITIS, UNSPECIFIED TRIGGER: Primary | ICD-10-CM

## 2021-09-28 PROCEDURE — 99213 OFFICE O/P EST LOW 20 MIN: CPT | Performed by: FAMILY MEDICINE

## 2021-09-28 RX ORDER — BETAMETHASONE DIPROPIONATE 0.5 MG/G
CREAM TOPICAL
Qty: 15 G | Refills: 0 | Status: SHIPPED | OUTPATIENT
Start: 2021-09-28 | End: 2021-10-28

## 2021-09-28 ASSESSMENT — ENCOUNTER SYMPTOMS
COUGH: 0
SHORTNESS OF BREATH: 0
ABDOMINAL PAIN: 0

## 2021-09-28 NOTE — PROGRESS NOTES
SUBJECTIVE:  Elisa Perry   1995   female   No Known Allergies    Chief Complaint   Patient presents with    Rash     hips and back of knees         Patient Active Problem List    Diagnosis Date Noted    Allergic rhinitis 07/22/2014       HPI   Patient is here today complaining of 5 to 3-week history of a rash. Patient reports that the rash is only on each side of her hips. It does sometimes get better but then it flares up intermittently. Rash is itchy, but not painful. No fever or chills  No known exposures. But speaking with patient further looks like she has some work pants that have zippers that the inside of zipper touches in the same area that the rash is. No other rashes. No were no sent home with the rash. She is an [address]  Past Medical History:   Diagnosis Date    Allergic rhinitis      Social History     Socioeconomic History    Marital status: Single     Spouse name: Not on file    Number of children: Not on file    Years of education: Not on file    Highest education level: Not on file   Occupational History    Not on file   Tobacco Use    Smoking status: Never Smoker    Smokeless tobacco: Never Used   Substance and Sexual Activity    Alcohol use: No     Alcohol/week: 0.0 standard drinks    Drug use: No    Sexual activity: Yes     Partners: Male     Birth control/protection: Pill   Other Topics Concern    Not on file   Social History Narrative    Not on file     Social Determinants of Health     Financial Resource Strain:     Difficulty of Paying Living Expenses:    Food Insecurity:     Worried About Running Out of Food in the Last Year:     [address] in the Last Year:    Transportation Needs:     Lack of Transportation (Medical):      Lack of Transportation (Non-Medical):    Physical Activity:     Days of Exercise per Week:     Minutes of Exercise per Session:    Stress:     Feeling of Stress :    Social Connections:     Frequency of Communication with Friends and Family:     Frequency of Social Gatherings with Friends and Family:     Attends Yarsani Services:     Active Member of Clubs or Organizations:     Attends Club or Organization Meetings:     Marital Status:    Intimate Partner Violence:     Fear of Current or Ex-Partner:     Emotionally Abused:     Physically Abused:     Sexually Abused:      No family history on file. Review of Systems   Constitutional: Negative for activity change, appetite change, fever and unexpected weight change. Respiratory: Negative for cough and shortness of breath. Cardiovascular: Negative for chest pain and palpitations. Gastrointestinal: Negative for abdominal pain. Musculoskeletal: Negative for arthralgias and myalgias. Skin: Positive for rash. Neurological: Negative for light-headedness and headaches. Hematological: Negative for adenopathy. Does not bruise/bleed easily. Psychiatric/Behavioral: Negative for dysphoric mood. OBJECTIVE:  /70   Pulse 74   Temp 97.8 °F (36.6 °C)   Wt 138 lb (62.6 kg)   SpO2 99%   BMI 23.69 kg/m²   Physical Exam  Vitals and nursing note reviewed. Constitutional:       Appearance: She is well-developed. Eyes:      Pupils: Pupils are equal, round, and reactive to light. Neck:      Thyroid: No thyromegaly. Vascular: No JVD. Cardiovascular:      Rate and Rhythm: Normal rate and regular rhythm. Pulmonary:      Effort: Pulmonary effort is normal.      Breath sounds: Normal breath sounds. Abdominal:      General: Bowel sounds are normal.      Palpations: Abdomen is soft. Tenderness: There is no abdominal tenderness. Musculoskeletal:      Cervical back: Normal range of motion and neck supple. Skin:     Findings: Rash present. Comments:  There are 2 very similar symmetrical patches about 3 to 4 cm of dry erythematous skin on hips   Neurological:      Mental Status: She is alert and oriented to person, place, and time.   Psychiatric:         Behavior: Behavior normal.         Thought Content: Thought content normal.         ASSESSMENT/PLAN:    1. Allergic contact dermatitis, unspecified trigger  Diprolene cream  Zyrtec as needed  Discussed that covering the back of zipper with tape or sewing more comfortable fabric so it does not come in contact directly with the skin   Reevaluate if symptoms persist    2. Pruritic dermatitis  Diprolene cream and Zyrtec      No orders of the defined types were placed in this encounter. Current Outpatient Medications   Medication Sig Dispense Refill    augmented betamethasone dipropionate (DIPROLENE AF) 0.05 % cream Apply topically 2 times daily. 15 g 0    fluconazole (DIFLUCAN) 150 MG tablet Take one tablet today, may repeat in 3 days 2 tablet 0     No current facility-administered medications for this visit. Return if symptoms worsen or fail to improve.     Quinton Ronquillo MD, MD

## 2021-10-07 ASSESSMENT — ENCOUNTER SYMPTOMS
CHEST TIGHTNESS: 0
VOMITING: 0
WHEEZING: 0
ABDOMINAL PAIN: 0
COUGH: 0
SHORTNESS OF BREATH: 0
CONSTIPATION: 0
DIARRHEA: 0
NAUSEA: 0

## 2021-10-08 ENCOUNTER — OFFICE VISIT (OUTPATIENT)
Dept: FAMILY MEDICINE CLINIC | Age: 26
End: 2021-10-08
Payer: COMMERCIAL

## 2021-10-08 VITALS
HEART RATE: 96 BPM | DIASTOLIC BLOOD PRESSURE: 80 MMHG | BODY MASS INDEX: 23.52 KG/M2 | WEIGHT: 137.8 LBS | HEIGHT: 64 IN | SYSTOLIC BLOOD PRESSURE: 112 MMHG

## 2021-10-08 DIAGNOSIS — L23.9 ALLERGIC CONTACT DERMATITIS, UNSPECIFIED TRIGGER: Primary | ICD-10-CM

## 2021-10-08 DIAGNOSIS — L29.9 PRURITIC DERMATITIS: ICD-10-CM

## 2021-10-08 PROCEDURE — 99213 OFFICE O/P EST LOW 20 MIN: CPT | Performed by: CLINICAL NURSE SPECIALIST

## 2021-10-08 RX ORDER — METHYLPREDNISOLONE 4 MG/1
TABLET ORAL
Qty: 1 KIT | Refills: 0 | Status: SHIPPED | OUTPATIENT
Start: 2021-10-08 | End: 2021-10-14

## 2021-10-08 SDOH — ECONOMIC STABILITY: FOOD INSECURITY: WITHIN THE PAST 12 MONTHS, YOU WORRIED THAT YOUR FOOD WOULD RUN OUT BEFORE YOU GOT MONEY TO BUY MORE.: NEVER TRUE

## 2021-10-08 SDOH — ECONOMIC STABILITY: FOOD INSECURITY: WITHIN THE PAST 12 MONTHS, THE FOOD YOU BOUGHT JUST DIDN'T LAST AND YOU DIDN'T HAVE MONEY TO GET MORE.: NEVER TRUE

## 2021-10-08 ASSESSMENT — ENCOUNTER SYMPTOMS: RHINORRHEA: 0

## 2021-10-08 ASSESSMENT — PATIENT HEALTH QUESTIONNAIRE - PHQ9
SUM OF ALL RESPONSES TO PHQ QUESTIONS 1-9: 0
SUM OF ALL RESPONSES TO PHQ QUESTIONS 1-9: 0
SUM OF ALL RESPONSES TO PHQ9 QUESTIONS 1 & 2: 0
1. LITTLE INTEREST OR PLEASURE IN DOING THINGS: 0
2. FEELING DOWN, DEPRESSED OR HOPELESS: 0
SUM OF ALL RESPONSES TO PHQ QUESTIONS 1-9: 0

## 2021-10-08 ASSESSMENT — SOCIAL DETERMINANTS OF HEALTH (SDOH): HOW HARD IS IT FOR YOU TO PAY FOR THE VERY BASICS LIKE FOOD, HOUSING, MEDICAL CARE, AND HEATING?: NOT HARD AT ALL

## 2021-10-08 NOTE — PATIENT INSTRUCTIONS
Continue Zyrtec 10 mg nightly for itching    Steroid dose pack as needed (take with food)    Can continue topical steroids twice a day  Patient Education        Dermatitis: Care Instructions  Your Care Instructions  Dermatitis is the general name used for any rash or inflammation of the skin. Different kinds of dermatitis cause different kinds of rashes. Common causes of a rash include new medicines, plants (such as poison oak or poison ivy), heat, and stress. Certain illnesses can also cause a rash. An allergic reaction to something that touches your skin, such as latex, nickel, or poison ivy, is called contact dermatitis. Contact dermatitis may also be caused by something that irritates the skin, such as bleach, a chemical, or soap. These types of rashes cannot be spread from person to person. How long your rash will last depends on what caused it. Rashes may last a few days or months. Follow-up care is a key part of your treatment and safety. Be sure to make and go to all appointments, and call your doctor if you are having problems. It's also a good idea to know your test results and keep a list of the medicines you take. How can you care for yourself at home? · Do not scratch the rash. Cut your nails short, and file them smooth. Or wear gloves if this helps keep you from scratching. · Wash the area with water only. Pat dry. · Put cold, wet cloths on the rash to reduce itching. · Keep cool, and stay out of the sun. · Leave the rash open to the air as much as possible. · If the rash itches, use hydrocortisone cream. Follow the directions on the label. Calamine lotion may help for plant rashes. · Take an over-the-counter antihistamine, such as diphenhydramine (Benadryl) or loratadine (Claritin), to help calm the itching. Read and follow all instructions on the label. · If your doctor prescribed a cream, use it as directed. If your doctor prescribed medicine, take it exactly as directed.   When should you call for help? Call your doctor now or seek immediate medical care if:    · You have symptoms of infection, such as:  ? Increased pain, swelling, warmth, or redness. ? Red streaks leading from the area. ? Pus draining from the area. ? A fever.     · You have joint pain along with the rash. Watch closely for changes in your health, and be sure to contact your doctor if:    · Your rash is changing or getting worse.     · You are not getting better as expected. Where can you learn more? Go to https://AvokiapeXtreme Installseb.[email or web address]. org and sign in to your Ever[employer] account. Enter [phone] in the Diabetic[employer] box to learn more about \"Dermatitis: Care Instructions. \"     If you do not have an account, please click on the \"Sign Up Now\" link. Current as of: March 3, 2021               Content Version: 13.0  © 6277-1455 Healthwise, Incorporated. Care instructions adapted under license by [facility] ([facility]). If you have questions about a medical condition or this instruction, always ask your healthcare professional. [place] [address] any warranty or liability for your use of this information.

## 2021-10-08 NOTE — PROGRESS NOTES
SUBJECTIVE:    Patient ID:  Pattie dRz is a 27 y.o. female      Patient is here for an acute visit for concerns of a rash for on hips for about a month and has spread to the left arm and upper chest/shoulder. States it flares during/after a shower and through out the day. Denies any new laundry detergents, fabric softeners, soaps, shampoos, lotions, other beauty products, foods, medications, pets, ill contacts or recent travel. Rash  This is a new problem. Episode onset: 3 weeks. The problem has been waxing and waning since onset. Location: hips, left arm, right shoulder. The rash is characterized by redness. She was exposed to nothing. Pertinent negatives include no anorexia, congestion, cough, diarrhea, facial edema, fatigue, fever, rhinorrhea, shortness of breath or vomiting. Past treatments include topical steroids. The treatment provided mild relief. Current Outpatient Medications on File Prior to Visit   Medication Sig Dispense Refill    SPIRONOLACTONE-HCTZ PO Take by mouth      augmented betamethasone dipropionate (DIPROLENE AF) 0.05 % cream Apply topically 2 times daily. 15 g 0    fluconazole (DIFLUCAN) 150 MG tablet Take one tablet today, may repeat in 3 days (Patient not taking: Reported on 10/8/2021) 2 tablet 0     No current facility-administered medications on file prior to visit. Past Medical History:   Diagnosis Date    Allergic rhinitis      History reviewed. No pertinent surgical history. History reviewed. No pertinent family history.   Social History     Socioeconomic History    Marital status: Single     Spouse name: Not on file    Number of children: Not on file    Years of education: Not on file    Highest education level: Not on file   Occupational History    Not on file   Tobacco Use    Smoking status: Never Smoker    Smokeless tobacco: Never Used   Vaping Use    Vaping Use: Never used   Substance and Sexual Activity    Alcohol use: No     Alcohol/week: 0.0 standard drinks    Drug use: No    Sexual activity: Yes     Partners: Male     Birth control/protection: Pill   Other Topics Concern    Not on file   Social History Narrative    Not on file     Social Determinants of Health     Financial Resource Strain: Low Risk     Difficulty of Paying Living Expenses: Not hard at all   Food Insecurity: No Food Insecurity    Worried About Running Out of Food in the Last Year: Never true    [address] in the Last Year: Never true   Transportation Needs:     Lack of Transportation (Medical):  Lack of Transportation (Non-Medical):    Physical Activity:     Days of Exercise per Week:     Minutes of Exercise per Session:    Stress:     Feeling of Stress :    Social Connections:     Frequency of Communication with Friends and Family:     Frequency of Social Gatherings with Friends and Family:     Attends Congregational Services:     Active Member of Clubs or Organizations:     Attends Club or Organization Meetings:     Marital Status:    Intimate Partner Violence:     Fear of Current or Ex-Partner:     Emotionally Abused:     Physically Abused:     Sexually Abused:        Review of Systems   Constitutional: Negative for chills, fatigue and fever. HENT: Negative for congestion and rhinorrhea. Eyes: Negative for visual disturbance. Respiratory: Negative for cough, chest tightness, shortness of breath and wheezing. Cardiovascular: Negative for chest pain, palpitations and leg swelling. Gastrointestinal: Negative for abdominal pain, anorexia, constipation, diarrhea, nausea and vomiting. Musculoskeletal: Negative for arthralgias and myalgias. Skin: Positive for rash. Neurological: Negative for headaches. OBJECTIVE:    Physical Exam  Vitals and nursing note reviewed. Constitutional:       General: She is not in acute distress. Appearance: She is well-developed. HENT:      Head: Normocephalic and atraumatic.       Right Ear: External ear normal.      Left Ear: External ear normal.      Nose: Nose normal.   Eyes:      Conjunctiva/sclera: Conjunctivae normal.      Pupils: Pupils are equal, round, and reactive to light. Neck:      Trachea: No tracheal deviation. Cardiovascular:      Rate and Rhythm: Normal rate and regular rhythm. Heart sounds: Normal heart sounds. Pulmonary:      Effort: Pulmonary effort is normal. No respiratory distress. Breath sounds: Normal breath sounds. No wheezing or rales. Chest:      Chest wall: No tenderness. Abdominal:      General: Bowel sounds are normal. There is no distension. Palpations: Abdomen is soft. Tenderness: There is no abdominal tenderness. Musculoskeletal:         General: Normal range of motion. Cervical back: Normal range of motion and neck supple. Skin:     General: Skin is warm and dry. Findings: Rash present. Neurological:      Mental Status: She is alert and oriented to person, place, and time. Psychiatric:         Behavior: Behavior normal.       /80   Pulse 96   Ht 5' 4\" (1.626 m)   Wt 137 lb 12.8 oz (62.5 kg)   BMI 23.65 kg/m²    BP Readings from Last 3 Encounters:   10/08/21 112/80   09/28/21 100/70   06/18/21 112/74      Wt Readings from Last 3 Encounters:   10/08/21 137 lb 12.8 oz (62.5 kg)   09/28/21 138 lb (62.6 kg)   06/18/21 136 lb (61.7 kg)       ASSESSMENT & PLAN:    1. Allergic contact dermatitis, unspecified trigger  - methylPREDNISolone (MEDROL DOSEPACK) 4 MG tablet; Take by mouth. Dispense: 1 kit; Refill: 0  - Continue Zyrtec 10 mg nightly for itching  - Steroid dose pack as needed (take with food)  - Can continue topical steroids twice a day    2. Pruritic dermatitis  - methylPREDNISolone (MEDROL DOSEPACK) 4 MG tablet; Take by mouth. Dispense: 1 kit;  Refill: 0  - Continue Zyrtec 10 mg nightly for itching  - Steroid dose pack as needed (take with food)  - Can continue topical steroids twice a day      Continue current treatment plan. Current Outpatient Medications   Medication Sig Dispense Refill    SPIRONOLACTONE-HCTZ PO Take by mouth      methylPREDNISolone (MEDROL DOSEPACK) 4 MG tablet Take by mouth. 1 kit 0    augmented betamethasone dipropionate (DIPROLENE AF) 0.05 % cream Apply topically 2 times daily. 15 g 0    fluconazole (DIFLUCAN) 150 MG tablet Take one tablet today, may repeat in 3 days (Patient not taking: Reported on 10/8/2021) 2 tablet 0     No current facility-administered medications for this visit. Return if symptoms worsen or fail to improve, for dermatitis, puritis . Chandana Suarez received counseling on the following healthy behaviors: nutrition, exercise and medication adherence    Patient given educational materials on dermatitis    Discussed use, benefit, and side effects of prescribed medications. Barriers to medication compliance addressed. All patient questions answered. Pt voiced understanding. Call office if experience side effects from medications. Please note that some or all of this record was generated using voice recognition software. If there are any questions about the content of this document, please contact the author as some errors in transcription may have occurred.

## 2022-02-09 ASSESSMENT — ENCOUNTER SYMPTOMS
CONSTIPATION: 0
NAUSEA: 0
ABDOMINAL PAIN: 0
VOMITING: 0
RHINORRHEA: 0
DIARRHEA: 0
COLOR CHANGE: 0
CHEST TIGHTNESS: 0
BACK PAIN: 0
COUGH: 0
WHEEZING: 0
SHORTNESS OF BREATH: 0

## 2022-02-10 ENCOUNTER — OFFICE VISIT (OUTPATIENT)
Dept: FAMILY MEDICINE CLINIC | Age: 27
End: 2022-02-10
Payer: COMMERCIAL

## 2022-02-10 VITALS
BODY MASS INDEX: 24.41 KG/M2 | HEIGHT: 64 IN | DIASTOLIC BLOOD PRESSURE: 76 MMHG | SYSTOLIC BLOOD PRESSURE: 116 MMHG | HEART RATE: 71 BPM | OXYGEN SATURATION: 98 % | WEIGHT: 143 LBS

## 2022-02-10 DIAGNOSIS — Z00.00 ANNUAL PHYSICAL EXAM: Primary | ICD-10-CM

## 2022-02-10 PROCEDURE — 99395 PREV VISIT EST AGE 18-39: CPT | Performed by: CLINICAL NURSE SPECIALIST

## 2022-02-10 RX ORDER — CLINDAMYCIN AND BENZOYL PEROXIDE 10; 50 MG/G; MG/G
GEL TOPICAL
COMMUNITY
Start: 2022-01-06

## 2022-02-10 NOTE — PATIENT INSTRUCTIONS
Discussed health maintenance, information given    Follow-up as needed for acute issues/illness    Schedule an MA/nurse visit for your TB test on Monday or Tuesday to be read in 48 to 72 hours after administered, second one within 2 weeks  Patient Education        Well Visit, Ages 25 to 48: Care Instructions  Overview     Well visits can help you stay healthy. Your doctor has checked your overall health and may have suggested ways to take good care of yourself. Your doctor also may have recommended tests. At home, you can help prevent illness with healthy eating, regular exercise, and other steps. Follow-up care is a key part of your treatment and safety. Be sure to make and go to all appointments, and call your doctor if you are having problems. It's also a good idea to know your test results and keep a list of the medicines you take. How can you care for yourself at home? · Get screening tests that you and your doctor decide on. Screening helps find diseases before any symptoms appear. · Eat healthy foods. Choose fruits, vegetables, whole grains, protein, and low-fat dairy foods. Limit fat, especially saturated fat. Reduce salt in your diet. · Limit alcohol. If you are a man, have no more than 2 drinks a day or 14 drinks a week. If you are a woman, have no more than 1 drink a day or 7 drinks a week. · Get at least 30 minutes of physical activity on most days of the week. Walking is a good choice. You also may want to do other activities, such as running, swimming, cycling, or playing tennis or team sports. Discuss any changes in your exercise program with your doctor. · Reach and stay at a healthy weight. This will lower your risk for many problems, such as obesity, diabetes, heart disease, and high blood pressure. · Do not smoke or allow others to smoke around you. If you need help quitting, talk to your doctor about stop-smoking programs and medicines.  These can increase your chances of quitting for good.  · Care for your mental health. It is easy to get weighed down by worry and stress. Learn strategies to manage stress, like deep breathing and mindfulness, and stay connected with your family and community. If you find you often feel sad or hopeless, talk with your doctor. Treatment can help. · Talk to your doctor about whether you have any risk factors for sexually transmitted infections (STIs). You can help prevent STIs if you wait to have sex with a new partner (or partners) until you've each been tested for STIs. It also helps if you use condoms (male or female condoms) and if you limit your sex partners to one person who only has sex with you. Vaccines are available for some STIs, such as HPV. · Use birth control if it's important to you to prevent pregnancy. Talk with your doctor about the choices available and what might be best for you. · If you think you may have a problem with alcohol or drug use, talk to your doctor. This includes prescription medicines (such as amphetamines and opioids) and illegal drugs (such as cocaine and methamphetamine). Your doctor can help you figure out what type of treatment is best for you. · Protect your skin from too much sun. When you're outdoors from 10 a.m. to 4 p.m., stay in the shade or cover up with clothing and a hat with a wide brim. Wear sunglasses that block UV rays. Even when it's cloudy, put broad-spectrum sunscreen (SPF 30 or higher) on any exposed skin. · See a dentist one or two times a year for checkups and to have your teeth cleaned. · Wear a seat belt in the car. When should you call for help? Watch closely for changes in your health, and be sure to contact your doctor if you have any problems or symptoms that concern you. Where can you learn more? Go to [email or web address]://[email or web address].health-[email or web address] org and sign in to your [employer] account.  Enter P072 in the Fixmo box to learn more about \"Well Visit, Ages 25 to 48: Care Instructions. \"     If you do not have an account, please click on the \"Sign Up Now\" link. Current as of: October 6, 2021               Content Version: 13.1  © 2006-2021 Healthwise, [employer]. Care instructions adapted under license by [facility] ([facility]). If you have questions about a medical condition or this instruction, always ask your healthcare professional. [address] any warranty or liability for your use of this information. Patient Education        Well Visit, Ages 25 to 48: Care Instructions  Overview     Well visits can help you stay healthy. Your doctor has checked your overall health and may have suggested ways to take good care of yourself. Your doctor also may have recommended tests. At home, you can help prevent illness with healthy eating, regular exercise, and other steps. Follow-up care is a key part of your treatment and safety. Be sure to make and go to all appointments, and call your doctor if you are having problems. It's also a good idea to know your test results and keep a list of the medicines you take. How can you care for yourself at home? · Get screening tests that you and your doctor decide on. Screening helps find diseases before any symptoms appear. · Eat healthy foods. Choose fruits, vegetables, whole grains, protein, and low-fat dairy foods. Limit fat, especially saturated fat. Reduce salt in your diet. · Limit alcohol. If you are a man, have no more than 2 drinks a day or 14 drinks a week. If you are a woman, have no more than 1 drink a day or 7 drinks a week. · Get at least 30 minutes of physical activity on most days of the week. Walking is a good choice. You also may want to do other activities, such as running, swimming, cycling, or playing tennis or team sports. Discuss any changes in your exercise program with your doctor. · Reach and stay at a healthy weight.  This will lower your risk for many problems, such as obesity, diabetes, heart disease, and high blood pressure. · Do not smoke or allow others to smoke around you. If you need help quitting, talk to your doctor about stop-smoking programs and medicines. These can increase your chances of quitting for good. · Care for your mental health. It is easy to get weighed down by worry and stress. Learn strategies to manage stress, like deep breathing and mindfulness, and stay connected with your family and community. If you find you often feel sad or hopeless, talk with your doctor. Treatment can help. · Talk to your doctor about whether you have any risk factors for sexually transmitted infections (STIs). You can help prevent STIs if you wait to have sex with a new partner (or partners) until you've each been tested for STIs. It also helps if you use condoms (male or female condoms) and if you limit your sex partners to one person who only has sex with you. Vaccines are available for some STIs, such as HPV. · Use birth control if it's important to you to prevent pregnancy. Talk with your doctor about the choices available and what might be best for you. · If you think you may have a problem with alcohol or drug use, talk to your doctor. This includes prescription medicines (such as amphetamines and opioids) and illegal drugs (such as cocaine and methamphetamine). Your doctor can help you figure out what type of treatment is best for you. · Protect your skin from too much sun. When you're outdoors from 10 a.m. to 4 p.m., stay in the shade or cover up with clothing and a hat with a wide brim. Wear sunglasses that block UV rays. Even when it's cloudy, put broad-spectrum sunscreen (SPF 30 or higher) on any exposed skin. · See a dentist one or two times a year for checkups and to have your teeth cleaned. · Wear a seat belt in the car. When should you call for help?   Watch closely for changes in your health, and be sure to contact your doctor if you have any problems or symptoms that concern you.  Where can you learn more? Go to https://[email or web address] and sign in to your Lytics account. Enter P072 in the "[employer] box to learn more about \"Well Visit, Ages 25 to 48: Care Instructions. \"     If you do not have an account, please click on the \"Sign Up Now\" link. Current as of: October 6, 2021               Content Version: 13.1  © 2006-2021 Healthwise, Incorporated. Care instructions adapted under license by [facility] ([facility]). If you have questions about a medical condition or this instruction, always ask your healthcare professional. [address] any warranty or liability for your use of this information.

## 2022-02-10 NOTE — PROGRESS NOTES
SUBJECTIVE:    Patient ID:  Kaitlin Borden is a 27 y.o. female      Patient is here for a complete physical.  She has no questions or concerns regarding her health. Medications: Spironolactone  Supplements: Cilegen powder and biotin  Diet:healthy   Activity: weight lifting and cardio 5-6 times a week  Safety: Uses sunscreen when out for extended periods of time, wears her seatbelt, does not drink and drive, non-smoker      Current Outpatient Medications on File Prior to Visit   Medication Sig Dispense Refill    SPIRONOLACTONE-HCTZ PO Take by mouth      clindamycin-benzoyl peroxide (BENZACLIN) 1-5 % gel APPLY TO CLEAN FACE ONCE DAILY IN THE MORNING (Patient not taking: Reported on 2/10/2022)      fluconazole (DIFLUCAN) 150 MG tablet Take one tablet today, may repeat in 3 days (Patient not taking: Reported on 2/10/2022) 2 tablet 0     No current facility-administered medications on file prior to visit. Past Medical History:   Diagnosis Date    Allergic rhinitis      No past surgical history on file. No family history on file.   Social History     Socioeconomic History    Marital status: Single     Spouse name: Not on file    Number of children: Not on file    Years of education: Not on file    Highest education level: Not on file   Occupational History    Not on file   Tobacco Use    Smoking status: Never Smoker    Smokeless tobacco: Never Used   Vaping Use    Vaping Use: Never used   Substance and Sexual Activity    Alcohol use: No     Alcohol/week: 0.0 standard drinks    Drug use: No    Sexual activity: Yes     Partners: Male     Birth control/protection: Pill   Other Topics Concern    Not on file   Social History Narrative    Not on file     Social Determinants of Health     Financial Resource Strain: Low Risk     Difficulty of Paying Living Expenses: Not hard at all   Food Insecurity: No Food Insecurity    Worried About [address] in the Last Year: Never true    Guzman of Negative for headaches. Hematological: Does not bruise/bleed easily. Psychiatric/Behavioral: Negative for dysphoric mood and sleep disturbance. The patient is not nervous/anxious. OBJECTIVE:    Physical Exam  Vitals and nursing note reviewed. Constitutional:       General: She is not in acute distress. Appearance: Normal appearance. She is well-developed. She is not ill-appearing or diaphoretic. HENT:      Head: Normocephalic and atraumatic. Right Ear: Tympanic membrane, ear canal and external ear normal.      Left Ear: Tympanic membrane, ear canal and external ear normal.      Nose: Nose normal.      Mouth/Throat:      Mouth: Mucous membranes are moist.      Pharynx: No oropharyngeal exudate. Eyes:      General:         Right eye: No discharge. Left eye: No discharge. Conjunctiva/sclera: Conjunctivae normal.      Pupils: Pupils are equal, round, and reactive to light. Neck:      Thyroid: No thyromegaly. Vascular: No JVD. Trachea: No tracheal deviation. Cardiovascular:      Rate and Rhythm: Normal rate and regular rhythm. Heart sounds: Normal heart sounds. No murmur heard. No friction rub. No gallop. Pulmonary:      Effort: Pulmonary effort is normal. No respiratory distress. Breath sounds: Normal breath sounds. No wheezing or rales. Chest:      Chest wall: No tenderness. Abdominal:      General: Bowel sounds are normal. There is no distension. Palpations: Abdomen is soft. There is no mass. Tenderness: There is no abdominal tenderness. There is no guarding or rebound. Musculoskeletal:         General: No tenderness. Normal range of motion. Cervical back: Normal range of motion and neck supple. Right lower leg: No edema. Left lower leg: No edema. Lymphadenopathy:      Cervical: No cervical adenopathy. Skin:     General: Skin is warm and dry. Coloration: Skin is not pale. Findings: No rash.    Neurological: Mental Status: She is alert and oriented to person, place, and time. Cranial Nerves: No cranial nerve deficit. Motor: No abnormal muscle tone. Coordination: Coordination normal.      Deep Tendon Reflexes: Reflexes are normal and symmetric. Reflexes normal.   Psychiatric:         Speech: Speech normal.         Behavior: Behavior normal.       /76   Pulse 71   Ht 5' 4\" (1.626 m)   Wt 143 lb (64.9 kg)   LMP 02/07/2022   SpO2 98%   BMI 24.55 kg/m²    BP Readings from Last 3 Encounters:   02/10/22 116/76   10/08/21 112/80   09/28/21 100/70      Wt Readings from Last 3 Encounters:   02/10/22 143 lb (64.9 kg)   10/08/21 137 lb 12.8 oz (62.5 kg)   09/28/21 138 lb (62.6 kg)       ASSESSMENT & PLAN:    1. Annual physical exam  - CBC Auto Differential; Future  - Comprehensive Metabolic Panel; Future  - Lipid Panel; Future  - TSH with Reflex; Future  - POCT Urinalysis no Micro      Continue current treatment plan. Current Outpatient Medications   Medication Sig Dispense Refill    SPIRONOLACTONE-HCTZ PO Take by mouth      clindamycin-benzoyl peroxide (BENZACLIN) 1-5 % gel APPLY TO CLEAN FACE ONCE DAILY IN THE MORNING (Patient not taking: Reported on 2/10/2022)      fluconazole (DIFLUCAN) 150 MG tablet Take one tablet today, may repeat in 3 days (Patient not taking: Reported on 2/10/2022) 2 tablet 0     No current facility-administered medications for this visit. Return if symptoms worsen or fail to improve, for annual physical.    René Baez received counseling on the following healthy behaviors: nutrition, exercise and medication adherence    Patient given educational materials on health maintenance    Discussed use, benefit, and side effects of prescribed medications. Barriers to medication compliance addressed. All patient questions answered. Pt voiced understanding. Call office if experience side effects from medications.       Please note that some or all of this record was generated using voice recognition software. If there are any questions about the content of this document, please contact the author as some errors in transcription may have occurred.

## 2022-02-14 ENCOUNTER — TELEPHONE (OUTPATIENT)
Dept: FAMILY MEDICINE CLINIC | Age: 27
End: 2022-02-14

## 2022-02-14 NOTE — TELEPHONE ENCOUNTER
----- Message from Louis Jang sent at 2/14/2022  4:08 PM EST -----  Subject: Message to Provider    QUESTIONS  Information for Provider? pt called in to schedule an appointment to get   her T-DAP shot please ccall her back too schedule this   ---------------------------------------------------------------------------  --------------  CALL BACK INFO  What is the best way for the office to contact you? OK to leave message on   voicemail  Preferred Call Back Phone Number?  [phone]  ---------------------------------------------------------------------------  --------------  SCRIPT ANSWERS  undefined

## 2022-02-16 ENCOUNTER — TELEPHONE (OUTPATIENT)
Dept: FAMILY MEDICINE CLINIC | Age: 27
End: 2022-02-16

## 2022-02-16 NOTE — TELEPHONE ENCOUNTER
----- Message from Nalini sent at 2/16/2022 11:43 AM EST -----  Subject: Message to Provider    QUESTIONS  Information for Provider? Patient called in requesting TDAP vaccine,   ---------------------------------------------------------------------------  --------------  CALL BACK INFO  What is the best way for the office to contact you? OK to leave message on   voicemail  Preferred Call Back Phone Number? [phone]  ---------------------------------------------------------------------------  --------------  SCRIPT ANSWERS  Relationship to Patient? Self  (Is the patient requesting to see the provider for a procedure?)? Yes  (Is the patient requesting to see the provider urgently  today or   tomorrow. )? No  Have you been diagnosed with, awaiting test results for, or told that you   are suspected of having COVID-19 (Coronavirus)? (If patient has tested   negative or was tested as a requirement for work, school, or travel and   not based on symptoms, answer no)? No  Within the past 10 days have you developed any of the following symptoms   (answer no if symptoms have been present longer than 10 days or began   more than 10 days ago)? Fever or Chills, Cough, Shortness of breath or   difficulty breathing, Loss of taste or smell, Sore throat, Nasal   congestion, Sneezing or runny nose, Fatigue or generalized body aches   (answer no if pain is specific to a body part e.g. back pain), Diarrhea,   Headache? No  Have you had close contact with someone with COVID-19 in the last 7 days? No  (Service Expert  click yes below to proceed with [employer] As Usual   Scheduling)?  Yes

## 2022-02-17 ENCOUNTER — NURSE ONLY (OUTPATIENT)
Dept: FAMILY MEDICINE CLINIC | Age: 27
End: 2022-02-17
Payer: COMMERCIAL

## 2022-02-17 DIAGNOSIS — Z23 NEED FOR TDAP VACCINATION: Primary | ICD-10-CM

## 2022-02-17 PROCEDURE — 90715 TDAP VACCINE 7 YRS/> IM: CPT | Performed by: FAMILY MEDICINE

## 2022-02-17 PROCEDURE — 90471 IMMUNIZATION ADMIN: CPT | Performed by: FAMILY MEDICINE

## 2022-10-04 ENCOUNTER — OFFICE VISIT (OUTPATIENT)
Dept: FAMILY MEDICINE CLINIC | Age: 27
End: 2022-10-04
Payer: COMMERCIAL

## 2022-10-04 VITALS
RESPIRATION RATE: 18 BRPM | HEART RATE: 68 BPM | OXYGEN SATURATION: 99 % | TEMPERATURE: 97.5 F | WEIGHT: 145.5 LBS | HEIGHT: 64 IN | DIASTOLIC BLOOD PRESSURE: 72 MMHG | BODY MASS INDEX: 24.84 KG/M2 | SYSTOLIC BLOOD PRESSURE: 139 MMHG

## 2022-10-04 DIAGNOSIS — Z23 NEED FOR VACCINATION: ICD-10-CM

## 2022-10-04 DIAGNOSIS — J30.9 ALLERGIC RHINITIS, UNSPECIFIED SEASONALITY, UNSPECIFIED TRIGGER: Primary | ICD-10-CM

## 2022-10-04 DIAGNOSIS — Z00.00 WELLNESS EXAMINATION: ICD-10-CM

## 2022-10-04 LAB
A/G RATIO: 2 (ref 1.1–2.2)
ALBUMIN SERPL-MCNC: 4.6 G/DL (ref 3.4–5)
ALP BLD-CCNC: 41 U/L (ref 40–129)
ALT SERPL-CCNC: 12 U/L (ref 10–40)
ANION GAP SERPL CALCULATED.3IONS-SCNC: 12 MMOL/L (ref 3–16)
AST SERPL-CCNC: 18 U/L (ref 15–37)
BASOPHILS ABSOLUTE: 0 K/UL (ref 0–0.2)
BASOPHILS RELATIVE PERCENT: 0.6 %
BILIRUB SERPL-MCNC: 0.4 MG/DL (ref 0–1)
BUN BLDV-MCNC: 11 MG/DL (ref 7–20)
CALCIUM SERPL-MCNC: 9.2 MG/DL (ref 8.3–10.6)
CHLORIDE BLD-SCNC: 103 MMOL/L (ref 99–110)
CHOLESTEROL, TOTAL: 161 MG/DL (ref 0–199)
CO2: 24 MMOL/L (ref 21–32)
CREAT SERPL-MCNC: 0.6 MG/DL (ref 0.6–1.1)
EOSINOPHILS ABSOLUTE: 0.1 K/UL (ref 0–0.6)
EOSINOPHILS RELATIVE PERCENT: 2.7 %
GFR AFRICAN AMERICAN: >60
GFR NON-AFRICAN AMERICAN: >60
GLUCOSE BLD-MCNC: 90 MG/DL (ref 70–99)
HBV SURFACE AB TITR SER: <3.5 MIU/ML
HCT VFR BLD CALC: 38.4 % (ref 36–48)
HDLC SERPL-MCNC: 70 MG/DL (ref 40–60)
HEMOGLOBIN: 13 G/DL (ref 12–16)
LDL CHOLESTEROL CALCULATED: 78 MG/DL
LYMPHOCYTES ABSOLUTE: 1.7 K/UL (ref 1–5.1)
LYMPHOCYTES RELATIVE PERCENT: 36.7 %
MCH RBC QN AUTO: 32.2 PG (ref 26–34)
MCHC RBC AUTO-ENTMCNC: 33.8 G/DL (ref 31–36)
MCV RBC AUTO: 95.3 FL (ref 80–100)
MONOCYTES ABSOLUTE: 0.2 K/UL (ref 0–1.3)
MONOCYTES RELATIVE PERCENT: 5 %
NEUTROPHILS ABSOLUTE: 2.6 K/UL (ref 1.7–7.7)
NEUTROPHILS RELATIVE PERCENT: 55 %
PDW BLD-RTO: 12.6 % (ref 12.4–15.4)
PLATELET # BLD: 191 K/UL (ref 135–450)
PMV BLD AUTO: 8 FL (ref 5–10.5)
POTASSIUM SERPL-SCNC: 4.3 MMOL/L (ref 3.5–5.1)
RBC # BLD: 4.03 M/UL (ref 4–5.2)
SODIUM BLD-SCNC: 139 MMOL/L (ref 136–145)
TOTAL PROTEIN: 6.9 G/DL (ref 6.4–8.2)
TRIGL SERPL-MCNC: 67 MG/DL (ref 0–150)
TSH REFLEX: 3.37 UIU/ML (ref 0.27–4.2)
VLDLC SERPL CALC-MCNC: 13 MG/DL
WBC # BLD: 4.7 K/UL (ref 4–11)

## 2022-10-04 PROCEDURE — 90674 CCIIV4 VAC NO PRSV 0.5 ML IM: CPT | Performed by: FAMILY MEDICINE

## 2022-10-04 PROCEDURE — 90471 IMMUNIZATION ADMIN: CPT | Performed by: FAMILY MEDICINE

## 2022-10-04 PROCEDURE — 99213 OFFICE O/P EST LOW 20 MIN: CPT | Performed by: FAMILY MEDICINE

## 2022-10-04 ASSESSMENT — ENCOUNTER SYMPTOMS
ABDOMINAL PAIN: 0
COUGH: 0
SHORTNESS OF BREATH: 0
CONSTIPATION: 0
DIARRHEA: 0
CHEST TIGHTNESS: 0

## 2022-10-04 NOTE — PROGRESS NOTES
SUBJECTIVE:  Jimy Coronado   1995   female   No Known Allergies    Chief Complaint   Patient presents with    Flu Vaccine    Other     LVM labs        Patient Active Problem List    Diagnosis Date Noted    Allergic rhinitis 07/22/2014       HPI   Patient is here today for follow-up immunizations and titers and allergic rhinitis. Patient reports overall she has been feeling well has no specific concerns or questions today. 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 She has completed hepatitis B vaccine some as an infant. She was also seen here for physical several months ago but has not had a chance to return for her blood work and wants to have that done today. She did have some labs done 4 years ago and everything was in the normal range including her cholesterol and vitamin D. Patient does report to be up-to-date with GYN exams and Pap smear and has had 1 within the year.   Past Medical History:   Diagnosis Date    Allergic rhinitis      Social History     Socioeconomic History    Marital status: Single     Spouse name: Not on file    Number of children: Not on file    Years of education: Not on file    Highest education level: Not on file   Occupational History    Not on file   Tobacco Use    Smoking status: Never    Smokeless tobacco: Never   Vaping Use    Vaping Use: Never used   Substance and Sexual Activity    Alcohol use: No     Alcohol/week: 0.0 standard drinks    Drug use: No    Sexual activity: Yes     Partners: Male     Birth control/protection: Pill   Other Topics Concern    Not on file   Social History Narrative    Not on file     Social Determinants of Health     Financial Resource Strain: Low Risk     Difficulty of Paying Living Expenses: Not hard at all   Food Insecurity: No Food Insecurity    Worried About Running Out of Food in the Last Year: Never true    [address] in the Last Year: Never true   Transportation Needs: Not on file   Physical Activity: Not on file   Stress: Not on file   Social Connections: Not on file   Intimate Partner Violence: Not on file   Housing Stability: Not on file     No family history on file. Review of Systems   Constitutional:  Negative for activity change, appetite change and unexpected weight change. Respiratory:  Negative for cough, chest tightness and shortness of breath. Cardiovascular:  Negative for chest pain, palpitations and leg swelling. Gastrointestinal:  Negative for abdominal pain, constipation and diarrhea. Musculoskeletal:  Negative for arthralgias and myalgias. Skin:  Negative for rash. Neurological:  Negative for light-headedness and headaches. Hematological:  Negative for adenopathy. Does not bruise/bleed easily. Psychiatric/Behavioral:  Negative for dysphoric mood and sleep disturbance. The patient is not nervous/anxious. OBJECTIVE:  /72   Pulse 68   Temp 97.5 °F (36.4 °C)   Resp 18   Ht 5' 4\" (1.626 m)   Wt 145 lb 8 oz (66 kg)   LMP 09/25/2022   SpO2 99%   BMI 24.98 kg/m²   Physical Exam  Vitals and nursing note reviewed. Constitutional:       Appearance: She is well-developed. Eyes:      Pupils: Pupils are equal, round, and reactive to light. Neck:      Thyroid: No thyromegaly. Vascular: No JVD. Cardiovascular:      Rate and Rhythm: Normal rate and regular rhythm. Pulmonary:      Effort: Pulmonary effort is normal.      Breath sounds: Normal breath sounds. Abdominal:      General: Bowel sounds are normal.      Palpations: Abdomen is soft. Tenderness: There is no abdominal tenderness. Musculoskeletal:      Cervical back: Normal range of motion and neck supple. Skin:     Findings: No rash. Neurological:      General: No focal deficit present. Mental Status: She is alert and oriented to person, place, and time. Psychiatric:         Behavior: Behavior normal.         Thought Content:  Thought content normal. ASSESSMENT/PLAN:    1. Allergic rhinitis, unspecified seasonality, unspecified trigger  As needed management    2. Need for vaccination    - Influenza, FLUCELVAX, (age 10 mo+), IM, PF, 0.5 mL  - Hepatitis B Surface Antibody; Future  - Hepatitis B Surface Antibody      Orders Placed This Encounter   Procedures    Influenza, FLUCELVAX, (age 10 mo+), IM, PF, 0.5 mL    Hepatitis B Surface Antibody     Standing Status:   Future     Number of Occurrences:   1     Standing Expiration Date:   10/4/2023     Current Outpatient Medications   Medication Sig Dispense Refill    clindamycin-benzoyl peroxide (BENZACLIN) 1-5 % gel APPLY TO CLEAN FACE ONCE DAILY IN THE MORNING (Patient not taking: Reported on 2/10/2022)      SPIRONOLACTONE-HCTZ PO Take by mouth      fluconazole (DIFLUCAN) 150 MG tablet Take one tablet today, may repeat in 3 days (Patient not taking: Reported on 2/10/2022) 2 tablet 0     No current facility-administered medications for this visit. No follow-ups on file.     Jo Lai MD, MD

## 2022-11-29 ENCOUNTER — OFFICE VISIT (OUTPATIENT)
Dept: FAMILY MEDICINE CLINIC | Age: 27
End: 2022-11-29
Payer: COMMERCIAL

## 2022-11-29 VITALS
HEART RATE: 74 BPM | WEIGHT: 143.4 LBS | OXYGEN SATURATION: 98 % | SYSTOLIC BLOOD PRESSURE: 110 MMHG | TEMPERATURE: 97.5 F | HEIGHT: 64 IN | DIASTOLIC BLOOD PRESSURE: 60 MMHG | BODY MASS INDEX: 24.48 KG/M2 | RESPIRATION RATE: 18 BRPM

## 2022-11-29 DIAGNOSIS — R07.89 CHEST WALL PAIN: ICD-10-CM

## 2022-11-29 DIAGNOSIS — R09.1 PLEURISY: Primary | ICD-10-CM

## 2022-11-29 DIAGNOSIS — J06.9 VIRAL URI: ICD-10-CM

## 2022-11-29 PROCEDURE — 99213 OFFICE O/P EST LOW 20 MIN: CPT | Performed by: FAMILY MEDICINE

## 2022-11-29 RX ORDER — METHYLPREDNISOLONE 4 MG/1
TABLET ORAL
Qty: 1 KIT | Refills: 0 | Status: SHIPPED | OUTPATIENT
Start: 2022-11-29 | End: 2022-12-05

## 2022-11-29 ASSESSMENT — ENCOUNTER SYMPTOMS
WHEEZING: 0
RHINORRHEA: 0
CHEST TIGHTNESS: 0
SHORTNESS OF BREATH: 0
DIARRHEA: 0
CONSTIPATION: 0
COUGH: 0
ABDOMINAL PAIN: 0

## 2022-11-29 ASSESSMENT — PATIENT HEALTH QUESTIONNAIRE - PHQ9
SUM OF ALL RESPONSES TO PHQ9 QUESTIONS 1 & 2: 0
2. FEELING DOWN, DEPRESSED OR HOPELESS: 0
SUM OF ALL RESPONSES TO PHQ QUESTIONS 1-9: 0
1. LITTLE INTEREST OR PLEASURE IN DOING THINGS: 0
SUM OF ALL RESPONSES TO PHQ QUESTIONS 1-9: 0

## 2022-11-29 NOTE — PROGRESS NOTES
SUBJECTIVE:  Stephen Shilpi   1995   female   No Known Allergies    Chief Complaint   Patient presents with    Other     Right side pain when laughing started last Monday , only has the pain when breathe in or laugh cough sneeze. Patient Active Problem List    Diagnosis Date Noted    Allergic rhinitis 07/22/2014       HPI   Patient is here today with complaints of right-sided chest pain after 2 weeks of URI symptoms. Patient reports a couple weeks ago she had cold symptoms including congestion postnasal drainage and a fairly severe cough which went on for almost 2 weeks. Patient reports she is improved from that standpoint and those symptoms have resolved but for the last week she has had a pain in the right side of her chest wall with deep inspiration or with laying down or sitting up from a laying down position. Patient denies any nausea or vomiting or GI symptoms. No change with food intake. No fever or chills. No shortness of breath. Patient reports ibuprofen helps for period of time but symptoms do return. No history of any injuries but she thought she may have injured her side after exercising. Denies any chest pain or palpitations.   Past Medical History:   Diagnosis Date    Allergic rhinitis      Social History     Socioeconomic History    Marital status: Single     Spouse name: Not on file    Number of children: Not on file    Years of education: Not on file    Highest education level: Not on file   Occupational History    Not on file   Tobacco Use    Smoking status: Never    Smokeless tobacco: Never   Vaping Use    Vaping Use: Never used   Substance and Sexual Activity    Alcohol use: No     Alcohol/week: 0.0 standard drinks    Drug use: No    Sexual activity: Yes     Partners: Male     Birth control/protection: Pill   Other Topics Concern    Not on file   Social History Narrative    Not on file     Social Determinants of Health     Financial Resource Strain: Not on file   Food Insecurity: Not on file   Transportation Needs: Not on file   Physical Activity: Not on file   Stress: Not on file   Social Connections: Not on file   Intimate Partner Violence: Not on file   Housing Stability: Not on file     History reviewed. No pertinent family history. Review of Systems   Constitutional:  Negative for activity change, appetite change and unexpected weight change. HENT:  Negative for congestion, postnasal drip and rhinorrhea. Respiratory:  Negative for cough, chest tightness, shortness of breath and wheezing. Cardiovascular:  Positive for chest pain. Negative for palpitations and leg swelling. Gastrointestinal:  Negative for abdominal pain, constipation and diarrhea. Musculoskeletal:  Negative for arthralgias and myalgias. Patient describes the pain on the right lateral chest wall below axilla   Skin:  Negative for rash. Neurological:  Negative for light-headedness and headaches. Hematological:  Negative for adenopathy. Does not bruise/bleed easily. Psychiatric/Behavioral:  Negative for dysphoric mood. OBJECTIVE:  /60   Pulse 74   Temp 97.5 °F (36.4 °C)   Resp 18   Ht 5' 4\" (1.626 m)   Wt 143 lb 6.4 oz (65 kg)   SpO2 98%   BMI 24.61 kg/m²   Physical Exam  Vitals and nursing note reviewed. Constitutional:       Appearance: She is well-developed. Eyes:      Pupils: Pupils are equal, round, and reactive to light. Neck:      Thyroid: No thyromegaly. Vascular: No JVD. Cardiovascular:      Rate and Rhythm: Normal rate and regular rhythm. Pulmonary:      Effort: Pulmonary effort is normal. No respiratory distress. Breath sounds: Normal breath sounds. No wheezing or rales. Abdominal:      General: Bowel sounds are normal.      Palpations: Abdomen is soft. Tenderness: There is no abdominal tenderness. Musculoskeletal:      Cervical back: Normal range of motion and neck supple.       Comments: No pain with palpitation right chest wall.   Skin:     Findings: No rash. Neurological:      General: No focal deficit present. Mental Status: She is alert and oriented to person, place, and time. Psychiatric:         Behavior: Behavior normal.         Thought Content: Thought content normal.       ASSESSMENT/PLAN:    1. Pleurisy  Medrol Dosepak  Aleve with food as needed  Reevaluate if symptoms persist or shortness of breath or fever  2. Chest wall pain    Rest, Medrol Dosepak and Aleve  3. Viral URI  Resolved      No orders of the defined types were placed in this encounter. Current Outpatient Medications   Medication Sig Dispense Refill    methylPREDNISolone (MEDROL DOSEPACK) 4 MG tablet Take by mouth. 1 kit 0    clindamycin-benzoyl peroxide (BENZACLIN) 1-5 % gel APPLY TO CLEAN FACE ONCE DAILY IN THE MORNING (Patient not taking: Reported on 2/10/2022)      SPIRONOLACTONE-HCTZ PO Take by mouth      fluconazole (DIFLUCAN) 150 MG tablet Take one tablet today, may repeat in 3 days (Patient not taking: Reported on 2/10/2022) 2 tablet 0     No current facility-administered medications for this visit. Return if symptoms worsen or fail to improve.     Tom Overton MD, MD

## 2022-12-05 DIAGNOSIS — R07.9 CHEST PAIN, UNSPECIFIED TYPE: Primary | ICD-10-CM

## 2022-12-06 ENCOUNTER — HOSPITAL ENCOUNTER (OUTPATIENT)
Age: 27
Discharge: HOME | End: 2022-12-06
Payer: COMMERCIAL

## 2022-12-06 ENCOUNTER — HOSPITAL ENCOUNTER (OUTPATIENT)
Dept: GENERAL RADIOLOGY | Age: 27
Discharge: HOME | End: 2022-12-06
Payer: COMMERCIAL

## 2022-12-06 DIAGNOSIS — R07.9 CHEST PAIN, UNSPECIFIED TYPE: ICD-10-CM

## 2022-12-06 PROCEDURE — 71046 X-RAY EXAM CHEST 2 VIEWS: CPT
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
## 2023-06-09 ENCOUNTER — OFFICE VISIT (OUTPATIENT)
Dept: FAMILY MEDICINE CLINIC | Age: 28
End: 2023-06-09

## 2023-06-09 VITALS
OXYGEN SATURATION: 97 % | SYSTOLIC BLOOD PRESSURE: 124 MMHG | BODY MASS INDEX: 23.9 KG/M2 | DIASTOLIC BLOOD PRESSURE: 60 MMHG | HEART RATE: 68 BPM | HEIGHT: 64 IN | WEIGHT: 140 LBS | RESPIRATION RATE: 16 BRPM | TEMPERATURE: 97.8 F

## 2023-06-09 DIAGNOSIS — N89.8 VAGINAL PRURITUS: ICD-10-CM

## 2023-06-09 DIAGNOSIS — J30.9 ALLERGIC RHINITIS, UNSPECIFIED SEASONALITY, UNSPECIFIED TRIGGER: ICD-10-CM

## 2023-06-09 DIAGNOSIS — R30.0 DYSURIA: Primary | ICD-10-CM

## 2023-06-09 LAB
BILIRUB UR QL STRIP.AUTO: NEGATIVE
BILIRUBIN, POC: NORMAL
BLOOD URINE, POC: NORMAL
CLARITY UR: CLEAR
CLARITY, POC: CLEAR
COLOR UR: YELLOW
COLOR, POC: YELLOW
GLUCOSE UR STRIP.AUTO-MCNC: NEGATIVE MG/DL
GLUCOSE URINE, POC: NORMAL
HGB UR QL STRIP.AUTO: NEGATIVE
KETONES UR STRIP.AUTO-MCNC: NEGATIVE MG/DL
KETONES, POC: NORMAL
LEUKOCYTE EST, POC: NORMAL
LEUKOCYTE ESTERASE UR QL STRIP.AUTO: NEGATIVE
NITRITE UR QL STRIP.AUTO: NEGATIVE
NITRITE, POC: NORMAL
PH UR STRIP.AUTO: 7 [PH] (ref 5–8)
PH, POC: 7
PROT UR STRIP.AUTO-MCNC: NEGATIVE MG/DL
PROTEIN, POC: NORMAL
SP GR UR STRIP.AUTO: 1.01 (ref 1–1.03)
SPECIFIC GRAVITY, POC: 1
UA COMPLETE W REFLEX CULTURE PNL UR: NORMAL
UA DIPSTICK W REFLEX MICRO PNL UR: NORMAL
URN SPEC COLLECT METH UR: NORMAL
UROBILINOGEN UR STRIP-ACNC: 0.2 E.U./DL
UROBILINOGEN, POC: NORMAL

## 2023-06-09 ASSESSMENT — ENCOUNTER SYMPTOMS
ABDOMINAL PAIN: 0
CONSTIPATION: 0
COUGH: 0
DIARRHEA: 0
SHORTNESS OF BREATH: 0
CHEST TIGHTNESS: 0

## 2023-06-09 NOTE — PROGRESS NOTES
return    2. Vaginal pruritus  Patient will try OTC Monistat meanwhile for possible yeast infection    3. Allergic rhinitis, unspecified seasonality, unspecified trigger  Continue with as needed management      Orders Placed This Encounter   Procedures    C.trachomatis N.gonorrhoeae DNA, Urine    Culture, Urine     Order Specific Question:   Specify (ex-cath, midstream, cysto, etc)? Answer:   midstream    Urinalysis with Reflex to Culture     Order Specific Question:   SPECIFY(EX-CATH,MIDSTREAM,CYSTO,ETC)? Answer:   midstream    POCT Urinalysis no Micro     Current Outpatient Medications   Medication Sig Dispense Refill    clindamycin-benzoyl peroxide (BENZACLIN) 1-5 % gel APPLY TO CLEAN FACE ONCE DAILY IN THE MORNING (Patient not taking: Reported on 2/10/2022)      SPIRONOLACTONE-HCTZ PO Take by mouth       No current facility-administered medications for this visit. No follow-ups on file.     Naty Alba MD, MD

## 2023-06-10 LAB — BACTERIA UR CULT: NORMAL

## 2023-08-30 LAB
CHOLEST SERPL-MCNC: 189 MG/DL (ref 0–199)
GLUCOSE SERPL-MCNC: 88 MG/DL (ref 70–99)
HDLC SERPL-MCNC: 76 MG/DL (ref 40–60)
LDLC SERPL CALC-MCNC: 94 MG/DL
TRIGL SERPL-MCNC: 97 MG/DL (ref 0–150)

## 2023-09-05 SDOH — HEALTH STABILITY: PHYSICAL HEALTH: ON AVERAGE, HOW MANY DAYS PER WEEK DO YOU ENGAGE IN MODERATE TO STRENUOUS EXERCISE (LIKE A BRISK WALK)?: 4 DAYS

## 2023-09-05 SDOH — HEALTH STABILITY: PHYSICAL HEALTH: ON AVERAGE, HOW MANY MINUTES DO YOU ENGAGE IN EXERCISE AT THIS LEVEL?: 40 MIN

## 2023-09-06 ENCOUNTER — OFFICE VISIT (OUTPATIENT)
Dept: ORTHOPEDIC SURGERY | Age: 28
End: 2023-09-06
Payer: COMMERCIAL

## 2023-09-06 VITALS — HEIGHT: 64 IN | BODY MASS INDEX: 23.9 KG/M2 | WEIGHT: 140 LBS

## 2023-09-06 DIAGNOSIS — S52.001A CLOSED FRACTURE OF PROXIMAL END OF RIGHT ULNA, UNSPECIFIED FRACTURE MORPHOLOGY, INITIAL ENCOUNTER: Primary | ICD-10-CM

## 2023-09-06 PROCEDURE — 99203 OFFICE O/P NEW LOW 30 MIN: CPT | Performed by: PHYSICIAN ASSISTANT

## 2023-09-06 RX ORDER — SPIRONOLACTONE 50 MG/1
TABLET, FILM COATED ORAL
COMMUNITY
Start: 2023-07-10

## 2023-09-06 RX ORDER — NORETHINDRONE ACETATE AND ETHINYL ESTRADIOL AND FERROUS FUMARATE 1.5-30(21)
KIT ORAL
COMMUNITY
Start: 2023-05-31

## 2023-09-20 ENCOUNTER — HOSPITAL ENCOUNTER (OUTPATIENT)
Dept: OCCUPATIONAL THERAPY | Age: 28
Setting detail: THERAPIES SERIES
Discharge: HOME | End: 2023-09-20
Payer: COMMERCIAL

## 2023-09-20 PROCEDURE — 97165 OT EVAL LOW COMPLEX 30 MIN: CPT | Performed by: OCCUPATIONAL THERAPIST

## 2023-09-20 PROCEDURE — 97112 NEUROMUSCULAR REEDUCATION: CPT | Performed by: OCCUPATIONAL THERAPIST
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
## 2023-09-20 NOTE — PLAN OF CARE
psychosocial history related to current    functional performance    [x] An assessment that identifies performance deficits (relating to physical, cognitive, or psychosocial skills) that result in activity limitations and/or participation restrictions:   [x] 1-3 performance deficits   [] 3-5 performance deficits   [] 5 or more performance deficits    [x] Clinical decision making of:   [x] low complexity, including analysis of occupational profile, data analysis from problem focused assessment, and consideration of a limited number of treatment options. No comorbidities affect occupational performance. No task modifications or assistance needed to complete evaluation. [] moderate complexity, including analysis of occupational profile, data analysis from detailed assessment and consideration of several treatment options. Comorbidities that affect occupational performance may be present. Minimal to moderate task modifications or assistance needed to complete assessment. [] high complexity, including analysis of occupational profile, analysis of data from comprehensive assessment and consideration of multiple treatment options. Multiple comorbidities present that affect occupational performance. Significant task modifications or assistance needed to complete assessment.     Evaluation Code:  [x] Low Complexity EVAL 92503 (typically 30 minutes face to face)  [] Mod Complexity EVAL 07137 (typically 45 minutes face to face)  [] High Complexity EVAL 74857 (typically 60 minutes face to face)    Electronically signed by: Danilo CARSON/MARIETAT [id number]

## 2023-09-26 ENCOUNTER — HOSPITAL ENCOUNTER (OUTPATIENT)
Dept: OCCUPATIONAL THERAPY | Age: 28
Setting detail: THERAPIES SERIES
Discharge: HOME | End: 2023-09-26
Payer: COMMERCIAL

## 2023-09-26 PROCEDURE — 97035 APP MDLTY 1+ULTRASOUND EA 15: CPT | Performed by: OCCUPATIONAL THERAPIST

## 2023-09-26 PROCEDURE — 97140 MANUAL THERAPY 1/> REGIONS: CPT | Performed by: OCCUPATIONAL THERAPIST

## 2023-09-26 NOTE — FLOWSHEET NOTE
Anesthesia Internal Medicine Internal Medicine Internal Medicine 25'   Total Treatment Minutes: 25'   Worker's Comp: Time In/Time Out     [] EVAL (LOW) 84067 (typically 20 minutes face-to-face)    [] EVAL (MOD) 18366 (typically 30 minutes face-to-face)  [] EVAL (HIGH) 28651 (typically 45 minutes face-to-face)  [] OT Re-eval (04775)       [] Ford (26937) x      [] JYXYH(74319)  [] NMR (76491) x      [] Estim (attended) (39393)   [x] Manual ([id number]) x  1    [x] US (05286)  [] TA (03996) x      [] Paraffin (75810)  [] ADL  (56408) x     [] Splint/L code:    [] Estim (unattended) (80738)  [] Fluidotherapy (57826)  [] Other:    Comorbidities Affecting Functional Performance:     []Anxiety (F41.9)/Depression (F32.9)   []Diabetes Type 1(E10.65) or 2 (E11.65)   []Rheumatoid Arthritis (M05.9)  []Fibromyalgia (M79.7)  []Neuropathy(G60.9)  []Osteoarthritis(M19.91)  []None   []Other:    ASSESSMENT  Appears that Pts primary issue is related to soft tissue crepitus at anterior elbow which is limiting active elbow ext. Pt also displays some irritation at medial epicondyle at elbow which appears to be aggravated by full elbow flexion sleep postures and possible inflammatory response from increased lifting activities. GOALS:  Patient stated goal: To return to normal wt lifting and working out routine    [] Progressing: [] Met: [] Not Met: [] Adjusted     Therapist goals for Patient:      Short Term Goals to be achieved in 3-4 weeks (through 10/20/23), including patient directed goals to address patient identified performance deficits:        1) Pt will demonstrate increased ROM to 0 degrees of elbow ext for improved independence with vocational tasks, lifting, and fitness work outs. [] Progressing: [] Met: [] Not Met: [] Adjusted      2) Pt will have a decrease in pain to 1-2/10 to facilitate vocational tasks, lifting, and fitness work outs. .  [] Progressing: [] Met: [] Not Met: [] Adjusted         Overall Progression Towards Functional Goals/Treatment Progress Update:  [] Patient is Internal Medicine

## 2023-10-03 ENCOUNTER — HOSPITAL ENCOUNTER (OUTPATIENT)
Dept: OCCUPATIONAL THERAPY | Age: 28
Setting detail: THERAPIES SERIES
Discharge: HOME | End: 2023-10-03
Payer: COMMERCIAL

## 2023-10-03 PROCEDURE — 97035 APP MDLTY 1+ULTRASOUND EA 15: CPT | Performed by: OCCUPATIONAL THERAPIST

## 2023-10-03 PROCEDURE — 97140 MANUAL THERAPY 1/> REGIONS: CPT | Performed by: OCCUPATIONAL THERAPIST
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
## 2023-10-23 ENCOUNTER — OFFICE VISIT (OUTPATIENT)
Dept: ORTHOPEDIC SURGERY | Age: 28
End: 2023-10-23
Payer: COMMERCIAL

## 2023-10-23 VITALS — BODY MASS INDEX: 23.9 KG/M2 | HEIGHT: 64 IN | WEIGHT: 140 LBS

## 2023-10-23 DIAGNOSIS — S52.001D CLOSED FRACTURE OF PROXIMAL END OF RIGHT ULNA WITH ROUTINE HEALING, UNSPECIFIED FRACTURE MORPHOLOGY, SUBSEQUENT ENCOUNTER: Primary | ICD-10-CM

## 2023-10-23 PROCEDURE — 99213 OFFICE O/P EST LOW 20 MIN: CPT | Performed by: PHYSICIAN ASSISTANT

## 2023-10-23 RX ORDER — NORETHINDRONE ACETATE AND ETHINYL ESTRADIOL AND FERROUS FUMARATE 1MG-20(21)
KIT ORAL
COMMUNITY
Start: 2023-09-27

## 2024-04-08 ENCOUNTER — OFFICE VISIT (OUTPATIENT)
Dept: FAMILY MEDICINE CLINIC | Age: 29
End: 2024-04-08
Payer: COMMERCIAL

## 2024-04-08 VITALS
BODY MASS INDEX: 25.4 KG/M2 | HEART RATE: 91 BPM | SYSTOLIC BLOOD PRESSURE: 120 MMHG | TEMPERATURE: 97.7 F | HEIGHT: 64 IN | WEIGHT: 148.8 LBS | DIASTOLIC BLOOD PRESSURE: 80 MMHG

## 2024-04-08 DIAGNOSIS — F41.9 ANXIETY: ICD-10-CM

## 2024-04-08 DIAGNOSIS — Z00.00 WELL ADULT EXAM: Primary | ICD-10-CM

## 2024-04-08 PROCEDURE — 99395 PREV VISIT EST AGE 18-39: CPT | Performed by: FAMILY MEDICINE

## 2024-04-08 RX ORDER — LORAZEPAM 1 MG/1
1 TABLET ORAL DAILY PRN
Qty: 4 TABLET | Refills: 0 | Status: SHIPPED | OUTPATIENT
Start: 2024-04-08 | End: 2024-05-08

## 2024-04-08 SDOH — ECONOMIC STABILITY: FOOD INSECURITY: WITHIN THE PAST 12 MONTHS, YOU WORRIED THAT YOUR FOOD WOULD RUN OUT BEFORE YOU GOT MONEY TO BUY MORE.: NEVER TRUE

## 2024-04-08 SDOH — ECONOMIC STABILITY: HOUSING INSECURITY
IN THE LAST 12 MONTHS, WAS THERE A TIME WHEN YOU DID NOT HAVE A STEADY PLACE TO SLEEP OR SLEPT IN A SHELTER (INCLUDING NOW)?: NO

## 2024-04-08 SDOH — ECONOMIC STABILITY: FOOD INSECURITY: WITHIN THE PAST 12 MONTHS, THE FOOD YOU BOUGHT JUST DIDN'T LAST AND YOU DIDN'T HAVE MONEY TO GET MORE.: NEVER TRUE

## 2024-04-08 SDOH — ECONOMIC STABILITY: INCOME INSECURITY: HOW HARD IS IT FOR YOU TO PAY FOR THE VERY BASICS LIKE FOOD, HOUSING, MEDICAL CARE, AND HEATING?: NOT HARD AT ALL

## 2024-04-08 ASSESSMENT — PATIENT HEALTH QUESTIONNAIRE - PHQ9
SUM OF ALL RESPONSES TO PHQ QUESTIONS 1-9: 0
SUM OF ALL RESPONSES TO PHQ9 QUESTIONS 1 & 2: 0
2. FEELING DOWN, DEPRESSED OR HOPELESS: NOT AT ALL
1. LITTLE INTEREST OR PLEASURE IN DOING THINGS: NOT AT ALL
SUM OF ALL RESPONSES TO PHQ QUESTIONS 1-9: 0

## 2024-04-08 NOTE — PROGRESS NOTES
importance of moderation in sodium/caffeine intake, saturated fat and cholesterol, caloric balance, sufficient intake of fresh fruits, vegetables, fiber, calcium, iron, and 1 mg of folate supplement per day (for females capable of pregnancy).  --Discussed the issue of estrogen replacement, calcium supplement, and the daily use of baby aspirin.  --Exercise: Stressed the importance of regular exercise.   --Substance Abuse: Discussed cessation/primary prevention of tobacco, alcohol, or other drug use; driving or other dangerous activities under the influence; availability of treatment for abuse.     --Injury prevention: Discussed safety belts, safety helmets, smoke detector, smoking near bedding or upholstery.   --Dental health: Discussed importance of regular tooth brushing, flossing, and dental visits.  --Immunizations reviewed.    --After hours service discussed with patient    3. Follow up as needed for acute illness

## 2025-04-11 ENCOUNTER — OFFICE VISIT (OUTPATIENT)
Dept: FAMILY MEDICINE CLINIC | Age: 30
End: 2025-04-11
Payer: COMMERCIAL

## 2025-04-11 VITALS
DIASTOLIC BLOOD PRESSURE: 60 MMHG | WEIGHT: 140 LBS | HEART RATE: 78 BPM | BODY MASS INDEX: 23.9 KG/M2 | HEIGHT: 64 IN | SYSTOLIC BLOOD PRESSURE: 122 MMHG

## 2025-04-11 DIAGNOSIS — L65.9 HAIR LOSS: ICD-10-CM

## 2025-04-11 DIAGNOSIS — Z00.00 ANNUAL PHYSICAL EXAM: Primary | ICD-10-CM

## 2025-04-11 PROCEDURE — 99395 PREV VISIT EST AGE 18-39: CPT | Performed by: FAMILY MEDICINE

## 2025-04-11 SDOH — ECONOMIC STABILITY: FOOD INSECURITY: WITHIN THE PAST 12 MONTHS, THE FOOD YOU BOUGHT JUST DIDN'T LAST AND YOU DIDN'T HAVE MONEY TO GET MORE.: NEVER TRUE

## 2025-04-11 SDOH — ECONOMIC STABILITY: FOOD INSECURITY: WITHIN THE PAST 12 MONTHS, YOU WORRIED THAT YOUR FOOD WOULD RUN OUT BEFORE YOU GOT MONEY TO BUY MORE.: NEVER TRUE

## 2025-04-11 ASSESSMENT — PATIENT HEALTH QUESTIONNAIRE - PHQ9
2. FEELING DOWN, DEPRESSED OR HOPELESS: NOT AT ALL
SUM OF ALL RESPONSES TO PHQ QUESTIONS 1-9: 0
1. LITTLE INTEREST OR PLEASURE IN DOING THINGS: NOT AT ALL

## 2025-04-11 NOTE — PROGRESS NOTES
lesions or rash.  There is some thinning of hair  Neck: no adenopathy and supple, symmetrical, trachea midline  Lungs: clear to auscultation bilaterally  Heart: regular rate and rhythm  Abdomen: soft, non-tender; bowel sounds normal; no masses,  no organomegaly  Neurologic: Grossly normal        UA-unable  Assessment:      Healthy female exam.      Hair loss  Plan:      1.  CPE labs including thyroid level and iron level and blood counts and cholesterol    2. Patient Counseling:  --Nutrition: Stressed importance of moderation in sodium/caffeine intake, saturated fat and cholesterol, caloric balance, sufficient intake of fresh fruits, vegetables, fiber, calcium, iron, and 1 mg of folate supplement per day (for females capable of pregnancy).  --Discussed the issue of estrogen replacement, calcium supplement, and the daily use of baby aspirin.  --Exercise: Stressed the importance of regular exercise.   --Substance Abuse: Discussed cessation/primary prevention of tobacco, alcohol, or other drug use; driving or other dangerous activities under the influence; availability of treatment for abuse.     --Injury prevention: Discussed safety belts, safety helmets, smoke detector, smoking near bedding or upholstery.   --Dental health: Discussed importance of regular tooth brushing, flossing, and dental visits.  --Immunizations reviewed.    --After hours service discussed with patient    3. Follow up as needed for acute illness

## 2025-04-14 ENCOUNTER — PATIENT MESSAGE (OUTPATIENT)
Dept: FAMILY MEDICINE CLINIC | Age: 30
End: 2025-04-14

## 2025-04-14 DIAGNOSIS — Z00.00 ANNUAL PHYSICAL EXAM: ICD-10-CM

## 2025-04-14 LAB
25(OH)D3 SERPL-MCNC: 38.8 NG/ML
ALBUMIN SERPL-MCNC: 4.1 G/DL (ref 3.4–5)
ALBUMIN/GLOB SERPL: 1.7 {RATIO} (ref 1.1–2.2)
ALP SERPL-CCNC: 40 U/L (ref 40–129)
ALT SERPL-CCNC: 13 U/L (ref 10–40)
ANION GAP SERPL CALCULATED.3IONS-SCNC: 8 MMOL/L (ref 3–16)
AST SERPL-CCNC: 19 U/L (ref 15–37)
BASOPHILS # BLD: 0 K/UL (ref 0–0.2)
BASOPHILS NFR BLD: 0.5 %
BILIRUB SERPL-MCNC: 0.6 MG/DL (ref 0–1)
BUN SERPL-MCNC: 9 MG/DL (ref 7–20)
CALCIUM SERPL-MCNC: 9.1 MG/DL (ref 8.3–10.6)
CHLORIDE SERPL-SCNC: 106 MMOL/L (ref 99–110)
CHOLEST SERPL-MCNC: 151 MG/DL (ref 0–199)
CO2 SERPL-SCNC: 24 MMOL/L (ref 21–32)
CREAT SERPL-MCNC: 0.7 MG/DL (ref 0.6–1.1)
DEPRECATED RDW RBC AUTO: 12.4 % (ref 12.4–15.4)
EOSINOPHIL # BLD: 0.2 K/UL (ref 0–0.6)
EOSINOPHIL NFR BLD: 4 %
GFR SERPLBLD CREATININE-BSD FMLA CKD-EPI: >90 ML/MIN/{1.73_M2}
GLUCOSE SERPL-MCNC: 86 MG/DL (ref 70–99)
HCT VFR BLD AUTO: 36.9 % (ref 36–48)
HDLC SERPL-MCNC: 70 MG/DL (ref 40–60)
HGB BLD-MCNC: 12.6 G/DL (ref 12–16)
IRON SERPL-MCNC: 222 UG/DL (ref 37–145)
LDLC SERPL CALC-MCNC: 62 MG/DL
LYMPHOCYTES # BLD: 1.4 K/UL (ref 1–5.1)
LYMPHOCYTES NFR BLD: 32.3 %
MCH RBC QN AUTO: 33 PG (ref 26–34)
MCHC RBC AUTO-ENTMCNC: 34.2 G/DL (ref 31–36)
MCV RBC AUTO: 96.7 FL (ref 80–100)
MONOCYTES # BLD: 0.2 K/UL (ref 0–1.3)
MONOCYTES NFR BLD: 4.4 %
NEUTROPHILS # BLD: 2.6 K/UL (ref 1.7–7.7)
NEUTROPHILS NFR BLD: 58.8 %
PLATELET # BLD AUTO: 198 K/UL (ref 135–450)
PMV BLD AUTO: 8.6 FL (ref 5–10.5)
POTASSIUM SERPL-SCNC: 4.3 MMOL/L (ref 3.5–5.1)
PROT SERPL-MCNC: 6.5 G/DL (ref 6.4–8.2)
RBC # BLD AUTO: 3.81 M/UL (ref 4–5.2)
SODIUM SERPL-SCNC: 138 MMOL/L (ref 136–145)
TRIGL SERPL-MCNC: 97 MG/DL (ref 0–150)
TSH SERPL DL<=0.005 MIU/L-ACNC: 1.78 UIU/ML (ref 0.27–4.2)
VLDLC SERPL CALC-MCNC: 19 MG/DL
WBC # BLD AUTO: 4.4 K/UL (ref 4–11)

## 2025-04-15 ENCOUNTER — RESULTS FOLLOW-UP (OUTPATIENT)
Dept: FAMILY MEDICINE CLINIC | Age: 30
End: 2025-04-15

## 2025-04-15 DIAGNOSIS — R79.89 HIGH TOTAL IRON BINDING CAPACITY: Primary | ICD-10-CM

## 2025-04-15 NOTE — TELEPHONE ENCOUNTER
Pt states she is only taking Nutrafil, BCP, and spralactone. Which medication is she supposed to stop prior to the iron testing.

## 2025-04-15 NOTE — TELEPHONE ENCOUNTER
Advised    Stop all OTC medications including Nutrifol prior to labs.  Continue all prescription meds.      Pt states understanding.

## 2025-04-16 DIAGNOSIS — R79.89 HIGH TOTAL IRON BINDING CAPACITY: ICD-10-CM

## 2025-04-16 DIAGNOSIS — Z00.00 WELL ADULT EXAM: ICD-10-CM

## 2025-04-16 LAB
ALBUMIN SERPL-MCNC: 4.4 G/DL (ref 3.4–5)
ALBUMIN/GLOB SERPL: 1.7 {RATIO} (ref 1.1–2.2)
ALP SERPL-CCNC: 44 U/L (ref 40–129)
ALT SERPL-CCNC: 13 U/L (ref 10–40)
ANION GAP SERPL CALCULATED.3IONS-SCNC: 10 MMOL/L (ref 3–16)
AST SERPL-CCNC: 19 U/L (ref 15–37)
BASOPHILS # BLD: 0 K/UL (ref 0–0.2)
BASOPHILS NFR BLD: 0.3 %
BILIRUB SERPL-MCNC: 0.5 MG/DL (ref 0–1)
BUN SERPL-MCNC: 15 MG/DL (ref 7–20)
CALCIUM SERPL-MCNC: 9.7 MG/DL (ref 8.3–10.6)
CHLORIDE SERPL-SCNC: 103 MMOL/L (ref 99–110)
CO2 SERPL-SCNC: 25 MMOL/L (ref 21–32)
CREAT SERPL-MCNC: 0.8 MG/DL (ref 0.6–1.1)
DEPRECATED RDW RBC AUTO: 12.4 % (ref 12.4–15.4)
EOSINOPHIL # BLD: 0.2 K/UL (ref 0–0.6)
EOSINOPHIL NFR BLD: 3.9 %
ERYTHROCYTE [SEDIMENTATION RATE] IN BLOOD BY WESTERGREN METHOD: 5 MM/HR (ref 0–20)
FERRITIN SERPL IA-MCNC: 81 NG/ML (ref 15–150)
GFR SERPLBLD CREATININE-BSD FMLA CKD-EPI: >90 ML/MIN/{1.73_M2}
GLUCOSE SERPL-MCNC: 83 MG/DL (ref 70–99)
HCT VFR BLD AUTO: 39.7 % (ref 36–48)
HGB BLD-MCNC: 13.7 G/DL (ref 12–16)
IRON SATN MFR SERPL: 32 % (ref 15–50)
IRON SERPL-MCNC: 103 UG/DL (ref 37–145)
LYMPHOCYTES # BLD: 1.7 K/UL (ref 1–5.1)
LYMPHOCYTES NFR BLD: 35 %
MCH RBC QN AUTO: 33 PG (ref 26–34)
MCHC RBC AUTO-ENTMCNC: 34.5 G/DL (ref 31–36)
MCV RBC AUTO: 95.7 FL (ref 80–100)
MONOCYTES # BLD: 0.3 K/UL (ref 0–1.3)
MONOCYTES NFR BLD: 5.9 %
NEUTROPHILS # BLD: 2.6 K/UL (ref 1.7–7.7)
NEUTROPHILS NFR BLD: 54.9 %
PLATELET # BLD AUTO: 210 K/UL (ref 135–450)
PMV BLD AUTO: 8.6 FL (ref 5–10.5)
POTASSIUM SERPL-SCNC: 4.8 MMOL/L (ref 3.5–5.1)
PROT SERPL-MCNC: 7 G/DL (ref 6.4–8.2)
RBC # BLD AUTO: 4.15 M/UL (ref 4–5.2)
SODIUM SERPL-SCNC: 138 MMOL/L (ref 136–145)
TIBC SERPL-MCNC: 325 UG/DL (ref 260–445)
WBC # BLD AUTO: 4.7 K/UL (ref 4–11)

## 2025-04-20 ENCOUNTER — RESULTS FOLLOW-UP (OUTPATIENT)
Dept: FAMILY MEDICINE CLINIC | Age: 30
End: 2025-04-20

## 2025-07-08 ENCOUNTER — OFFICE VISIT (OUTPATIENT)
Dept: ORTHOPEDIC SURGERY | Age: 30
End: 2025-07-08
Payer: COMMERCIAL

## 2025-07-08 VITALS — HEIGHT: 64 IN | BODY MASS INDEX: 23.9 KG/M2 | WEIGHT: 140 LBS

## 2025-07-08 DIAGNOSIS — M54.50 ACUTE BILATERAL LOW BACK PAIN, UNSPECIFIED WHETHER SCIATICA PRESENT: Primary | ICD-10-CM

## 2025-07-08 DIAGNOSIS — M51.379 DEGENERATIVE DISC DISEASE AT L5-S1 LEVEL: ICD-10-CM

## 2025-07-08 DIAGNOSIS — M54.50 LUMBAR PAIN: ICD-10-CM

## 2025-07-08 PROCEDURE — 99214 OFFICE O/P EST MOD 30 MIN: CPT | Performed by: PHYSICIAN ASSISTANT

## 2025-07-08 RX ORDER — MELOXICAM 15 MG/1
15 TABLET ORAL DAILY PRN
Qty: 30 TABLET | Refills: 0 | Status: SHIPPED | OUTPATIENT
Start: 2025-07-08

## 2025-07-08 NOTE — PROGRESS NOTES
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  She reports a 1 month history of stabbing bilateral low back/buttock pain.  Her symptoms are intermittent but increased with sitting or transition.  She reports some relief with resting.  Conservative care includes Aleve, Tylenol, prior chiropractics.  She currently denies any distal radiating pain.  Denies any progressive numbness tingling or extremity weakness.  Denies any recent bowel or bladder dysfunction or saddle anesthesia.  Denies any recent fevers chills or infections.    The pain assessment was noted & reviewed in the medical record today.     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    Past Medical History: Medical history form was reviewed today & scanned into the media tab  Past Medical History:   Diagnosis Date    Allergic rhinitis     Fractures 7/15/2023    R elbow fracture      Past Surgical History:     Past Surgical History:   Procedure Laterality Date    ELBOW FRACTURE SURGERY Right 07/2023    no surgery was needed     Current Medications:     Current Outpatient Medications:     MIRANDA FE 1/20 1-20 MG-MCG per tablet, , Disp: , Rfl:     spironolactone (ALDACTONE) 50 MG tablet, TAKE 1 TABLET BY MOUTH ONCE DAILY 0., Disp: , Rfl:     clindamycin-benzoyl peroxide (BENZACLIN) 1-5 % gel, APPLY TO CLEAN FACE ONCE DAILY IN THE MORNING (Patient not taking: Reported on 2/10/2022), Disp: , Rfl:   Allergies:  Patient has no known allergies.  Social History:    reports that

## 2025-07-10 ENCOUNTER — TELEPHONE (OUTPATIENT)
Dept: ORTHOPEDIC SURGERY | Age: 30
End: 2025-07-10

## 2025-07-10 NOTE — TELEPHONE ENCOUNTER
Left a voicemail for Zina Best regarding MRI LUMBAR SPINE approval and authorization being valid until 8/6/2025.  Patient was instructed that their MRI needs to be scheduled at [facility]. The patient was instructed to contact the facility to schedule  at [phone].      A follow up appointment has been scheduled for Tuesday 7/29/2025 at the [place] office.     The patient was provided contact information should the need arise to reschedule any of the appointments.